# Patient Record
Sex: FEMALE | Race: WHITE | NOT HISPANIC OR LATINO | URBAN - METROPOLITAN AREA
[De-identification: names, ages, dates, MRNs, and addresses within clinical notes are randomized per-mention and may not be internally consistent; named-entity substitution may affect disease eponyms.]

---

## 2021-12-29 ENCOUNTER — INPATIENT (INPATIENT)
Facility: HOSPITAL | Age: 81
LOS: 8 days | Discharge: HOME | DRG: 190 | End: 2022-01-07
Attending: INTERNAL MEDICINE | Admitting: INTERNAL MEDICINE
Payer: MEDICARE

## 2021-12-29 VITALS
RESPIRATION RATE: 14 BRPM | DIASTOLIC BLOOD PRESSURE: 63 MMHG | OXYGEN SATURATION: 94 % | HEART RATE: 70 BPM | TEMPERATURE: 99 F | HEIGHT: 67 IN | SYSTOLIC BLOOD PRESSURE: 131 MMHG | WEIGHT: 214.95 LBS

## 2021-12-29 DIAGNOSIS — J45.901 UNSPECIFIED ASTHMA WITH (ACUTE) EXACERBATION: ICD-10-CM

## 2021-12-29 LAB
ALBUMIN SERPL ELPH-MCNC: 2.8 G/DL — LOW (ref 3.3–5)
ALP SERPL-CCNC: 121 U/L — HIGH (ref 40–120)
ALT FLD-CCNC: 41 U/L — SIGNIFICANT CHANGE UP (ref 12–78)
ANION GAP SERPL CALC-SCNC: 4 MMOL/L — LOW (ref 5–17)
APPEARANCE UR: CLEAR — SIGNIFICANT CHANGE UP
APTT BLD: 35.5 SEC — SIGNIFICANT CHANGE UP (ref 27.5–35.5)
AST SERPL-CCNC: 25 U/L — SIGNIFICANT CHANGE UP (ref 15–37)
BASOPHILS # BLD AUTO: 0.02 K/UL — SIGNIFICANT CHANGE UP (ref 0–0.2)
BASOPHILS NFR BLD AUTO: 0.3 % — SIGNIFICANT CHANGE UP (ref 0–2)
BILIRUB SERPL-MCNC: 0.6 MG/DL — SIGNIFICANT CHANGE UP (ref 0.2–1.2)
BILIRUB UR-MCNC: NEGATIVE — SIGNIFICANT CHANGE UP
BUN SERPL-MCNC: 38 MG/DL — HIGH (ref 7–23)
CALCIUM SERPL-MCNC: 10.1 MG/DL — SIGNIFICANT CHANGE UP (ref 8.5–10.1)
CHLORIDE SERPL-SCNC: 102 MMOL/L — SIGNIFICANT CHANGE UP (ref 96–108)
CO2 SERPL-SCNC: 30 MMOL/L — SIGNIFICANT CHANGE UP (ref 22–31)
COLOR SPEC: YELLOW — SIGNIFICANT CHANGE UP
CREAT SERPL-MCNC: 1 MG/DL — SIGNIFICANT CHANGE UP (ref 0.5–1.3)
D DIMER BLD IA.RAPID-MCNC: <150 NG/ML DDU — SIGNIFICANT CHANGE UP
DIFF PNL FLD: NEGATIVE — SIGNIFICANT CHANGE UP
EOSINOPHIL # BLD AUTO: 0.01 K/UL — SIGNIFICANT CHANGE UP (ref 0–0.5)
EOSINOPHIL NFR BLD AUTO: 0.1 % — SIGNIFICANT CHANGE UP (ref 0–6)
FLUAV AG NPH QL: SIGNIFICANT CHANGE UP
FLUBV AG NPH QL: SIGNIFICANT CHANGE UP
GLUCOSE SERPL-MCNC: 193 MG/DL — HIGH (ref 70–99)
GLUCOSE UR QL: 1000 MG/DL
HCT VFR BLD CALC: 52.5 % — HIGH (ref 34.5–45)
HGB BLD-MCNC: 17.1 G/DL — HIGH (ref 11.5–15.5)
IMM GRANULOCYTES NFR BLD AUTO: 0.5 % — SIGNIFICANT CHANGE UP (ref 0–1.5)
INR BLD: 1.75 RATIO — HIGH (ref 0.88–1.16)
KETONES UR-MCNC: NEGATIVE — SIGNIFICANT CHANGE UP
LEUKOCYTE ESTERASE UR-ACNC: NEGATIVE — SIGNIFICANT CHANGE UP
LYMPHOCYTES # BLD AUTO: 0.91 K/UL — LOW (ref 1–3.3)
LYMPHOCYTES # BLD AUTO: 12.4 % — LOW (ref 13–44)
MAGNESIUM SERPL-MCNC: 2.1 MG/DL — SIGNIFICANT CHANGE UP (ref 1.6–2.6)
MCHC RBC-ENTMCNC: 31.3 PG — SIGNIFICANT CHANGE UP (ref 27–34)
MCHC RBC-ENTMCNC: 32.6 GM/DL — SIGNIFICANT CHANGE UP (ref 32–36)
MCV RBC AUTO: 96 FL — SIGNIFICANT CHANGE UP (ref 80–100)
MONOCYTES # BLD AUTO: 0.59 K/UL — SIGNIFICANT CHANGE UP (ref 0–0.9)
MONOCYTES NFR BLD AUTO: 8 % — SIGNIFICANT CHANGE UP (ref 2–14)
NEUTROPHILS # BLD AUTO: 5.78 K/UL — SIGNIFICANT CHANGE UP (ref 1.8–7.4)
NEUTROPHILS NFR BLD AUTO: 78.7 % — HIGH (ref 43–77)
NITRITE UR-MCNC: NEGATIVE — SIGNIFICANT CHANGE UP
NT-PROBNP SERPL-SCNC: 793 PG/ML — HIGH (ref 0–450)
PH UR: 5 — SIGNIFICANT CHANGE UP (ref 5–8)
PLATELET # BLD AUTO: 147 K/UL — LOW (ref 150–400)
POTASSIUM SERPL-MCNC: 4 MMOL/L — SIGNIFICANT CHANGE UP (ref 3.5–5.3)
POTASSIUM SERPL-SCNC: 4 MMOL/L — SIGNIFICANT CHANGE UP (ref 3.5–5.3)
PROT SERPL-MCNC: 6.9 GM/DL — SIGNIFICANT CHANGE UP (ref 6–8.3)
PROT UR-MCNC: 15 MG/DL
PROTHROM AB SERPL-ACNC: 19.8 SEC — HIGH (ref 10.6–13.6)
RBC # BLD: 5.47 M/UL — HIGH (ref 3.8–5.2)
RBC # FLD: 15.6 % — HIGH (ref 10.3–14.5)
RSV RNA NPH QL NAA+NON-PROBE: SIGNIFICANT CHANGE UP
SARS-COV-2 RNA SPEC QL NAA+PROBE: SIGNIFICANT CHANGE UP
SODIUM SERPL-SCNC: 136 MMOL/L — SIGNIFICANT CHANGE UP (ref 135–145)
SP GR SPEC: 1.01 — SIGNIFICANT CHANGE UP (ref 1.01–1.02)
TROPONIN I, HIGH SENSITIVITY RESULT: 16.4 NG/L — SIGNIFICANT CHANGE UP
UROBILINOGEN FLD QL: NEGATIVE MG/DL — SIGNIFICANT CHANGE UP
WBC # BLD: 7.35 K/UL — SIGNIFICANT CHANGE UP (ref 3.8–10.5)
WBC # FLD AUTO: 7.35 K/UL — SIGNIFICANT CHANGE UP (ref 3.8–10.5)

## 2021-12-29 PROCEDURE — 94640 AIRWAY INHALATION TREATMENT: CPT

## 2021-12-29 PROCEDURE — 81001 URINALYSIS AUTO W/SCOPE: CPT

## 2021-12-29 PROCEDURE — 94010 BREATHING CAPACITY TEST: CPT

## 2021-12-29 PROCEDURE — 83036 HEMOGLOBIN GLYCOSYLATED A1C: CPT

## 2021-12-29 PROCEDURE — 85025 COMPLETE CBC W/AUTO DIFF WBC: CPT

## 2021-12-29 PROCEDURE — 71045 X-RAY EXAM CHEST 1 VIEW: CPT | Mod: 26

## 2021-12-29 PROCEDURE — 85027 COMPLETE CBC AUTOMATED: CPT

## 2021-12-29 PROCEDURE — 71045 X-RAY EXAM CHEST 1 VIEW: CPT

## 2021-12-29 PROCEDURE — 99223 1ST HOSP IP/OBS HIGH 75: CPT

## 2021-12-29 PROCEDURE — 99285 EMERGENCY DEPT VISIT HI MDM: CPT

## 2021-12-29 PROCEDURE — U0003: CPT

## 2021-12-29 PROCEDURE — 82962 GLUCOSE BLOOD TEST: CPT

## 2021-12-29 PROCEDURE — 36415 COLL VENOUS BLD VENIPUNCTURE: CPT

## 2021-12-29 PROCEDURE — 83735 ASSAY OF MAGNESIUM: CPT

## 2021-12-29 PROCEDURE — U0005: CPT

## 2021-12-29 PROCEDURE — 84100 ASSAY OF PHOSPHORUS: CPT

## 2021-12-29 PROCEDURE — 80048 BASIC METABOLIC PNL TOTAL CA: CPT

## 2021-12-29 PROCEDURE — 93010 ELECTROCARDIOGRAM REPORT: CPT

## 2021-12-29 RX ORDER — DEXTROSE 50 % IN WATER 50 %
25 SYRINGE (ML) INTRAVENOUS ONCE
Refills: 0 | Status: DISCONTINUED | OUTPATIENT
Start: 2021-12-29 | End: 2021-12-30

## 2021-12-29 RX ORDER — ATORVASTATIN CALCIUM 80 MG/1
1 TABLET, FILM COATED ORAL
Qty: 0 | Refills: 0 | DISCHARGE

## 2021-12-29 RX ORDER — INSULIN LISPRO 100/ML
VIAL (ML) SUBCUTANEOUS AT BEDTIME
Refills: 0 | Status: DISCONTINUED | OUTPATIENT
Start: 2021-12-29 | End: 2021-12-30

## 2021-12-29 RX ORDER — CEFTRIAXONE 500 MG/1
1000 INJECTION, POWDER, FOR SOLUTION INTRAMUSCULAR; INTRAVENOUS EVERY 24 HOURS
Refills: 0 | Status: DISCONTINUED | OUTPATIENT
Start: 2021-12-30 | End: 2021-12-30

## 2021-12-29 RX ORDER — RIVAROXABAN 15 MG-20MG
20 KIT ORAL
Refills: 0 | Status: DISCONTINUED | OUTPATIENT
Start: 2021-12-29 | End: 2022-01-07

## 2021-12-29 RX ORDER — FUROSEMIDE 40 MG
40 TABLET ORAL ONCE
Refills: 0 | Status: COMPLETED | OUTPATIENT
Start: 2021-12-29 | End: 2021-12-29

## 2021-12-29 RX ORDER — CEFTRIAXONE 500 MG/1
1000 INJECTION, POWDER, FOR SOLUTION INTRAMUSCULAR; INTRAVENOUS ONCE
Refills: 0 | Status: COMPLETED | OUTPATIENT
Start: 2021-12-29 | End: 2021-12-29

## 2021-12-29 RX ORDER — MONTELUKAST 4 MG/1
1 TABLET, CHEWABLE ORAL
Qty: 0 | Refills: 0 | DISCHARGE

## 2021-12-29 RX ORDER — RIVAROXABAN 15 MG-20MG
1 KIT ORAL
Qty: 0 | Refills: 0 | DISCHARGE

## 2021-12-29 RX ORDER — METOPROLOL TARTRATE 50 MG
1 TABLET ORAL
Qty: 0 | Refills: 0 | DISCHARGE

## 2021-12-29 RX ORDER — ATORVASTATIN CALCIUM 80 MG/1
80 TABLET, FILM COATED ORAL AT BEDTIME
Refills: 0 | Status: DISCONTINUED | OUTPATIENT
Start: 2021-12-29 | End: 2022-01-07

## 2021-12-29 RX ORDER — AZITHROMYCIN 500 MG/1
500 TABLET, FILM COATED ORAL EVERY 24 HOURS
Refills: 0 | Status: DISCONTINUED | OUTPATIENT
Start: 2021-12-30 | End: 2021-12-30

## 2021-12-29 RX ORDER — INSULIN GLARGINE 100 [IU]/ML
30 INJECTION, SOLUTION SUBCUTANEOUS AT BEDTIME
Refills: 0 | Status: DISCONTINUED | OUTPATIENT
Start: 2021-12-29 | End: 2021-12-30

## 2021-12-29 RX ORDER — SODIUM CHLORIDE 9 MG/ML
1000 INJECTION, SOLUTION INTRAVENOUS
Refills: 0 | Status: DISCONTINUED | OUTPATIENT
Start: 2021-12-29 | End: 2021-12-30

## 2021-12-29 RX ORDER — SPIRONOLACTONE 25 MG/1
1 TABLET, FILM COATED ORAL
Qty: 0 | Refills: 0 | DISCHARGE

## 2021-12-29 RX ORDER — DULOXETINE HYDROCHLORIDE 30 MG/1
1 CAPSULE, DELAYED RELEASE ORAL
Qty: 0 | Refills: 0 | DISCHARGE

## 2021-12-29 RX ORDER — EMPAGLIFLOZIN 10 MG/1
1 TABLET, FILM COATED ORAL
Qty: 0 | Refills: 0 | DISCHARGE

## 2021-12-29 RX ORDER — MONTELUKAST 4 MG/1
10 TABLET, CHEWABLE ORAL DAILY
Refills: 0 | Status: DISCONTINUED | OUTPATIENT
Start: 2021-12-29 | End: 2022-01-07

## 2021-12-29 RX ORDER — FLUTICASONE FUROATE AND VILANTEROL TRIFENATATE 100; 25 UG/1; UG/1
1 POWDER RESPIRATORY (INHALATION)
Qty: 0 | Refills: 0 | DISCHARGE

## 2021-12-29 RX ORDER — AZITHROMYCIN 500 MG/1
TABLET, FILM COATED ORAL
Refills: 0 | Status: DISCONTINUED | OUTPATIENT
Start: 2021-12-29 | End: 2021-12-30

## 2021-12-29 RX ORDER — CEFTRIAXONE 500 MG/1
INJECTION, POWDER, FOR SOLUTION INTRAMUSCULAR; INTRAVENOUS
Refills: 0 | Status: DISCONTINUED | OUTPATIENT
Start: 2021-12-29 | End: 2021-12-30

## 2021-12-29 RX ORDER — DULAGLUTIDE 4.5 MG/.5ML
0 INJECTION, SOLUTION SUBCUTANEOUS
Qty: 0 | Refills: 0 | DISCHARGE

## 2021-12-29 RX ORDER — BUDESONIDE AND FORMOTEROL FUMARATE DIHYDRATE 160; 4.5 UG/1; UG/1
2 AEROSOL RESPIRATORY (INHALATION)
Refills: 0 | Status: DISCONTINUED | OUTPATIENT
Start: 2021-12-29 | End: 2022-01-07

## 2021-12-29 RX ORDER — INSULIN LISPRO 100/ML
VIAL (ML) SUBCUTANEOUS
Refills: 0 | Status: DISCONTINUED | OUTPATIENT
Start: 2021-12-29 | End: 2021-12-30

## 2021-12-29 RX ORDER — IPRATROPIUM/ALBUTEROL SULFATE 18-103MCG
3 AEROSOL WITH ADAPTER (GRAM) INHALATION ONCE
Refills: 0 | Status: COMPLETED | OUTPATIENT
Start: 2021-12-29 | End: 2021-12-29

## 2021-12-29 RX ORDER — ASPIRIN/CALCIUM CARB/MAGNESIUM 324 MG
162 TABLET ORAL ONCE
Refills: 0 | Status: COMPLETED | OUTPATIENT
Start: 2021-12-29 | End: 2021-12-29

## 2021-12-29 RX ORDER — SPIRONOLACTONE 25 MG/1
25 TABLET, FILM COATED ORAL DAILY
Refills: 0 | Status: DISCONTINUED | OUTPATIENT
Start: 2021-12-29 | End: 2022-01-07

## 2021-12-29 RX ORDER — INSULIN DEGLUDEC 100 U/ML
30 INJECTION, SOLUTION SUBCUTANEOUS
Qty: 0 | Refills: 0 | DISCHARGE

## 2021-12-29 RX ORDER — DEXTROSE 50 % IN WATER 50 %
15 SYRINGE (ML) INTRAVENOUS ONCE
Refills: 0 | Status: DISCONTINUED | OUTPATIENT
Start: 2021-12-29 | End: 2021-12-30

## 2021-12-29 RX ORDER — AZITHROMYCIN 500 MG/1
500 TABLET, FILM COATED ORAL ONCE
Refills: 0 | Status: COMPLETED | OUTPATIENT
Start: 2021-12-29 | End: 2021-12-29

## 2021-12-29 RX ORDER — DEXTROSE 50 % IN WATER 50 %
12.5 SYRINGE (ML) INTRAVENOUS ONCE
Refills: 0 | Status: DISCONTINUED | OUTPATIENT
Start: 2021-12-29 | End: 2021-12-30

## 2021-12-29 RX ORDER — NITROGLYCERIN 6.5 MG
1 CAPSULE, EXTENDED RELEASE ORAL ONCE
Refills: 0 | Status: COMPLETED | OUTPATIENT
Start: 2021-12-29 | End: 2021-12-29

## 2021-12-29 RX ORDER — DULOXETINE HYDROCHLORIDE 30 MG/1
60 CAPSULE, DELAYED RELEASE ORAL DAILY
Refills: 0 | Status: DISCONTINUED | OUTPATIENT
Start: 2021-12-29 | End: 2022-01-07

## 2021-12-29 RX ORDER — GLUCAGON INJECTION, SOLUTION 0.5 MG/.1ML
1 INJECTION, SOLUTION SUBCUTANEOUS ONCE
Refills: 0 | Status: DISCONTINUED | OUTPATIENT
Start: 2021-12-29 | End: 2022-01-07

## 2021-12-29 RX ORDER — METOPROLOL TARTRATE 50 MG
50 TABLET ORAL
Refills: 0 | Status: DISCONTINUED | OUTPATIENT
Start: 2021-12-29 | End: 2022-01-07

## 2021-12-29 RX ORDER — ALBUTEROL 90 UG/1
2 AEROSOL, METERED ORAL
Refills: 0 | Status: DISCONTINUED | OUTPATIENT
Start: 2021-12-29 | End: 2022-01-07

## 2021-12-29 RX ORDER — ONDANSETRON 8 MG/1
4 TABLET, FILM COATED ORAL EVERY 6 HOURS
Refills: 0 | Status: DISCONTINUED | OUTPATIENT
Start: 2021-12-29 | End: 2022-01-07

## 2021-12-29 RX ORDER — ALBUTEROL 90 UG/1
2 AEROSOL, METERED ORAL
Qty: 0 | Refills: 0 | DISCHARGE

## 2021-12-29 RX ADMIN — Medication 4: at 22:20

## 2021-12-29 RX ADMIN — MONTELUKAST 10 MILLIGRAM(S): 4 TABLET, CHEWABLE ORAL at 12:50

## 2021-12-29 RX ADMIN — ATORVASTATIN CALCIUM 80 MILLIGRAM(S): 80 TABLET, FILM COATED ORAL at 22:20

## 2021-12-29 RX ADMIN — Medication 50 MILLIGRAM(S): at 15:44

## 2021-12-29 RX ADMIN — Medication 3 MILLILITER(S): at 01:44

## 2021-12-29 RX ADMIN — Medication 1 INCH(S): at 01:43

## 2021-12-29 RX ADMIN — SPIRONOLACTONE 25 MILLIGRAM(S): 25 TABLET, FILM COATED ORAL at 12:51

## 2021-12-29 RX ADMIN — INSULIN GLARGINE 30 UNIT(S): 100 INJECTION, SOLUTION SUBCUTANEOUS at 22:20

## 2021-12-29 RX ADMIN — AZITHROMYCIN 255 MILLIGRAM(S): 500 TABLET, FILM COATED ORAL at 13:36

## 2021-12-29 RX ADMIN — Medication 1 INCH(S): at 13:00

## 2021-12-29 RX ADMIN — Medication 40 MILLIGRAM(S): at 12:50

## 2021-12-29 RX ADMIN — BUDESONIDE AND FORMOTEROL FUMARATE DIHYDRATE 2 PUFF(S): 160; 4.5 AEROSOL RESPIRATORY (INHALATION) at 21:26

## 2021-12-29 RX ADMIN — Medication 4: at 16:41

## 2021-12-29 RX ADMIN — CEFTRIAXONE 100 MILLIGRAM(S): 500 INJECTION, POWDER, FOR SOLUTION INTRAMUSCULAR; INTRAVENOUS at 12:36

## 2021-12-29 RX ADMIN — Medication 6: at 12:51

## 2021-12-29 RX ADMIN — ALBUTEROL 2 PUFF(S): 90 AEROSOL, METERED ORAL at 21:26

## 2021-12-29 RX ADMIN — Medication 162 MILLIGRAM(S): at 01:43

## 2021-12-29 RX ADMIN — DULOXETINE HYDROCHLORIDE 60 MILLIGRAM(S): 30 CAPSULE, DELAYED RELEASE ORAL at 12:49

## 2021-12-29 RX ADMIN — Medication 80 MILLIGRAM(S): at 15:44

## 2021-12-29 RX ADMIN — Medication 125 MILLIGRAM(S): at 01:43

## 2021-12-29 RX ADMIN — RIVAROXABAN 20 MILLIGRAM(S): KIT at 15:44

## 2021-12-29 RX ADMIN — Medication 40 MILLIGRAM(S): at 01:43

## 2021-12-29 NOTE — ED ADULT NURSE NOTE - NSIMPLEMENTINTERV_GEN_ALL_ED
Implemented All Fall with Harm Risk Interventions:  [place] to call system. Call bell, personal items and telephone within reach. Instruct patient to call for assistance. Room bathroom lighting operational. Non-slip footwear when patient is off stretcher. Physically safe environment: no spills, clutter or unnecessary equipment. Stretcher in lowest position, wheels locked, appropriate side rails in place. Provide visual cue, wrist band, yellow gown, etc. Monitor gait and stability. Monitor for mental status changes and reorient to person, place, and time. Review medications for side effects contributing to fall risk. Reinforce activity limits and safety measures with patient and family. Provide visual clues: red socks.

## 2021-12-29 NOTE — ED ADULT TRIAGE NOTE - CHIEF COMPLAINT QUOTE
c/o difficulty breathing for past 3 weeks with associated cough, denies fever, use home O2, normal O2 levels at home is 92% on home O2, oxygen level at home was 80%, patient's O2 sat in triage 89% on RA, pulse 73, c/o increased chest congestion, patient has history of COPD, heart failure, pulmonary HTN, patient's daughter Tania Gil #[phone]

## 2021-12-29 NOTE — ED PROVIDER NOTE - PHYSICAL EXAMINATION
Gen:  ill appearing slightly dyspneic  Head:  NC/AT  HEENT: pupils perrl,no pharyngeal erythema, uvula midline  Cardiac: S1S2, RRR  Abd: Soft, non tender  Resp: lungs with decreased bs and exp wheeZing bilate with crackles  musculoskeletal:: no deformities, no swelling, strength +5/+5  Skin: warm and dry as visualized, no rashes  Neuro: MOONEY, cn2-12, aao x 4  Psych:alert, cooperative, appropriate mood and affect for situation

## 2021-12-29 NOTE — ED PROVIDER NOTE - OBJECTIVE STATEMENT
81F pw dyspnea/sob from home,with PMH CAD/stent/CABG, afib on AC, PPM, T2DM, asthma w/ Home O2 at night 2.5LNC, HFrEF, presents c/o worsening wheezing, cough and sob.  Pt endorses she was treated for asthma exac 3 weeks ago w/ doxycycline, prednisone taper and was improving until prednisone was down to 10mg and she seemed to have worsening symptoms again.  Pt is visiting up here from [place] and her medical team is down there. She denies any fever/chills, cp, palpitations, LE edema, n/v/d, abd pain, dysuria.  Vacc x3.

## 2021-12-29 NOTE — H&P ADULT - ASSESSMENT
81F w/PMH CAD/stent/CABG, afib on AC, PPM, T2DM, asthma w/ Home O2 at night 2.5LNC, HFrEF, presents c/o worsening wheezing, cough and sob.  Pt endorses she was treated for asthma exac 3 weeks ago w/ doxycycline, prednisone taper and was improving until prednisone was down to 10mg and she seemed to have worsening symptoms again.  Pt is visiting up here from [place] and her medical team is down there. She denies any fever/chills, cp, palpitations, LE edema, n/v/d, abd pain, dysuria.  Vacc x3.    In ED, Hg 17, ua neg, rvp/cov neg, given solumedrol 125mg, albuterol, lasix, asa, NTG, on 2LNC now.   CXR (by my read)- no apparent pneumonia       #Asthma exacerbation w/worsening cough  - albuterol inh Q6h  - solumedrol 80mg IV BID  - O2 - titrate to keep >92%  - iv rocephin/azithro - until final cxr read r/o pna  - pulm cs  - prn mucinex (pt declines at this time)    #HFrEF- reported EF 50%- chronic  - stable, appears euvolemic, NOT in exacerbation at this time  - c/w home dose torsemide, aldactone    #T2DM  - hold oral meds  - start on lantus 30 u (per pt's home dose of tresiba),   - SSI and montior FS- titrate as needed  - carb controlled diet  - check HA1c    #Afib- persistent, chronic  - c/w xarelto  - c/w bb    #PPX   - on xarelto

## 2021-12-29 NOTE — ED ADULT NURSE NOTE - CHIEF COMPLAINT QUOTE
c/o difficulty breathing for past 3 weeks with associated cough, denies fever, use home O2, normal O2 levels at home is 92% on home O2, oxygen level at home was 80%, patient's O2 sat in triage 89% on RA, pulse 73, c/o increased chest congestion, patient has history of COPD, heart failure, pulmonary HTN, patient's daughter Tania Gil #[phone]

## 2021-12-29 NOTE — H&P ADULT - HISTORY OF PRESENT ILLNESS
HPI:      PAST MEDICAL & SURGICAL HISTORY:      Review of Systems:   CONSTITUTIONAL: No fever.  EYES: No eye pain or discharge.  ENMT:  No sinus or throat pain  NECK: No pain or stiffness  RESPIRATORY: No cough, wheezing, chills or hemoptysis; No shortness of breath  CARDIOVASCULAR: No chest pain, palpitations, dizziness, or leg swelling  GASTROINTESTINAL: No abdominal or epigastric pain. No nausea, vomiting, or hematemesis; No diarrhea or constipation. No melena or hematochezia.  GENITOURINARY: No dysuria or incontinence  NEUROLOGICAL: No headaches, memory loss, loss of strength, numbness, or tremors  SKIN: No rashes.  LYMPH NODES: No enlarged glands  ENDOCRINE: No heat or cold intolerance; No hair loss  MUSCULOSKELETAL: No joint pain or swelling; No muscle, back, or extremity pain  PSYCHIATRIC: No depression, anxiety, mood swings, or difficulty sleeping  HEME/LYMPH: No easy bruising, or bleeding gums  ALLERY AND IMMUNOLOGIC: No hives or eczema    Allergies    penicillin (Swelling)    Intolerances        Social History:     FAMILY HISTORY:      Home Medications:  albuterol 90 mcg/inh inhalation aerosol: 2 puff(s) inhaled every 6 hours, As Needed (29 Dec 2021 09:54)  atorvastatin 80 mg oral tablet: 1 tab(s) orally once a day (29 Dec 2021 09:54)  Breo Ellipta 200 mcg-25 mcg/inh inhalation powder: 1 puff(s) inhaled once a day (29 Dec 2021 09:54)  doxycycline hyclate 100 mg oral capsule: 1 cap(s) orally 2 times a day (29 Dec 2021 09:54)  DULoxetine 60 mg oral delayed release capsule: 1 cap(s) orally once a day (29 Dec 2021 09:54)  Jardiance 25 mg oral tablet: 1 tab(s) orally once a day (in the morning) (29 Dec 2021 09:54)  metoprolol succinate 50 mg oral tablet, extended release: 1 tab(s) orally 2 times a day (29 Dec 2021 09:54)  montelukast 10 mg oral tablet: 1 tab(s) orally once a day (29 Dec 2021 09:54)  predniSONE 10 mg oral tablet: 1 tab(s) orally once a day    taper (29 Dec 2021 09:54)  spironolactone 25 mg oral tablet: 1 tab(s) orally once a day (29 Dec 2021 09:54)  torsemide 20 mg oral tablet: 2 tab(s) orally once a day (29 Dec 2021 09:54)  Tresiba 100 units/mL subcutaneous solution: 30 unit(s) subcutaneous once a day (at bedtime) (29 Dec 2021 09:54)  Trulicity Pen 1.5 mg/0.5 mL subcutaneous solution: subcutaneous every 7 days (29 Dec 2021 09:54)  Xarelto 20 mg oral tablet: 1 tab(s) orally once a day (in the evening) (29 Dec 2021 09:54)      MEDICATIONS  (STANDING):  ALBUTerol    90 MICROgram(s) HFA Inhaler 2 Puff(s) Inhalation four times a day  atorvastatin 80 milliGRAM(s) Oral at bedtime  azithromycin  IVPB      cefTRIAXone   IVPB      DULoxetine 60 milliGRAM(s) Oral daily  methylPREDNISolone sodium succinate Injectable 80 milliGRAM(s) IV Push two times a day  metoprolol succinate ER 50 milliGRAM(s) Oral two times a day  montelukast 10 milliGRAM(s) Oral daily  rivaroxaban 20 milliGRAM(s) Oral with dinner  spironolactone 25 milliGRAM(s) Oral daily  torsemide 40 milliGRAM(s) Oral daily    MEDICATIONS  (PRN):  ondansetron Injectable 4 milliGRAM(s) IV Push every 6 hours PRN Nausea      CAPILLARY BLOOD GLUCOSE        I&O's Summary      PHYSICAL EXAM:  Vital Signs Last 24 Hrs  T(C): 36.3 (29 Dec 2021 07:23), Max: 37.2 (29 Dec 2021 00:25)  T(F): 97.3 (29 Dec 2021 07:23), Max: 98.9 (29 Dec 2021 00:25)  HR: 70 (29 Dec 2021 07:23) (70 - 72)  BP: 107/55 (29 Dec 2021 07:23) (100/59 - 135/62)  BP(mean): 68 (29 Dec 2021 03:47) (68 - 68)  RR: 18 (29 Dec 2021 07:23) (14 - 18)  SpO2: 94% (29 Dec 2021 07:23) (94% - 95%)  GENERAL: NAD, well-developed  HEAD:  Atraumatic, Normocephalic  EYES: EOMI, PERRLA, conjunctiva and sclera clear  ENT: normal hearing, no nasal discharge, throat clear, dentition normal  NECK: Supple, No JVD, no LAD, no thyromegaly   CHEST/LUNG: Clear to auscultation bilaterally; No wheeze, respirations unlabored  HEART: Regular rate and rhythm; No murmurs, rubs, or gallops  ABDOMEN: Soft, Nontender, Nondistended; Bowel sounds present, no HSM  EXTREMITIES:  2+ Peripheral Pulses, No clubbing, cyanosis, or edema  PSYCH: AAOx3, normal behavior  NEUROLOGY: non-focal, sensory and cn 2-12 intact, speech/language intact  SKIN: No visible rashes or lesions    LABS:                        17.1   7.35  )-----------( 147      ( 29 Dec 2021 00:59 )             52.5         136  |  102  |  38<H>  ----------------------------<  193<H>  4.0   |  30  |  1.00    Ca    10.1      29 Dec 2021 00:59  Mg     2.1         TPro  6.9  /  Alb  2.8<L>  /  TBili  0.6  /  DBili  x   /  AST  25  /  ALT  41  /  AlkPhos  121<H>      PT/INR - ( 29 Dec 2021 00:59 )   PT: 19.8 sec;   INR: 1.75 ratio         PTT - ( 29 Dec 2021 00:59 )  PTT:35.5 sec      Urinalysis Basic - ( 29 Dec 2021 04:38 )    Color: Yellow / Appearance: Clear / S.010 / pH: x  Gluc: x / Ketone: Negative  / Bili: Negative / Urobili: Negative mg/dL   Blood: x / Protein: 15 mg/dL / Nitrite: Negative   Leuk Esterase: Negative / RBC: Negative /HPF / WBC Negative   Sq Epi: x / Non Sq Epi: Occasional / Bacteria: Negative        RADIOLOGY & ADDITIONAL TESTS:    Imaging Personally Reviewed:    EKG Personally Reviewed:    Consultant(s) Notes Reviewed:      Care Discussed with Consultants/Other Providers:   HPI:  81F w/PMH CAD/stent/CABG, afib on AC, PPM, T2DM, asthma w/ Home O2 at night 2.5LNC, HFrEF, presents c/o worsening wheezing, cough and sob.  Pt endorses she was treated for asthma exac 3 weeks ago w/ doxycycline, prednisone taper and was improving until prednisone was down to 10mg and she seemed to have worsening symptoms again.  Pt is visiting up here from [place] and her medical team is down there. She denies any fever/chills, cp, palpitations, LE edema, n/v/d, abd pain, dysuria.  Vacc x3.    In ED, Hg 17, ua neg, rvp/cov neg, given solumedrol 125mg, albuterol, lasix, asa, NTG, on 2LNC now.   CXR (by my read)- no apparent pneumonia     PAST MEDICAL & SURGICAL HISTORY:  asthma  CAD  Afib on AC  T2DM  HFrEF (50%)  depression  s/p heart stent  s/p CABG (2018)  s/p PPM  s/p cholecystectomy  s/p hysterectomy  Left ankle fracture repair    Review of Systems:   CONSTITUTIONAL: No fever.  EYES: No eye pain or discharge.  ENMT:  No sinus or throat pain  NECK: No pain or stiffness  RESPIRATORY: see hpi   CARDIOVASCULAR: No chest pain, palpitations, dizziness, or leg swelling  GASTROINTESTINAL: No abdominal or epigastric pain. No nausea, vomiting, or hematemesis; No diarrhea or constipation. No melena or hematochezia.  GENITOURINARY: No dysuria or incontinence  NEUROLOGICAL: No headaches, memory loss, loss of strength, numbness, or tremors  SKIN: No rashes.  MUSCULOSKELETAL: No joint pain or swelling; No muscle, back, or extremity pain  PSYCHIATRIC: No depression, anxiety, mood swings, or difficulty sleeping      Allergies  penicillin (Swelling)    Social History:   lives in [place]  denies smoking/etoh  ind ADLs    FAMILY HISTORY:  Mother  of ovarian CA  Father - heart disease    Home Medications:  albuterol 90 mcg/inh inhalation aerosol: 2 puff(s) inhaled every 6 hours, As Needed (29 Dec 2021 09:54)  atorvastatin 80 mg oral tablet: 1 tab(s) orally once a day (29 Dec 2021 09:54)  Breo Ellipta 200 mcg-25 mcg/inh inhalation powder: 1 puff(s) inhaled once a day (29 Dec 2021 09:54)  doxycycline hyclate 100 mg oral capsule: 1 cap(s) orally 2 times a day (29 Dec 2021 09:54)  DULoxetine 60 mg oral delayed release capsule: 1 cap(s) orally once a day (29 Dec 2021 09:54)  Jardiance 25 mg oral tablet: 1 tab(s) orally once a day (in the morning) (29 Dec 2021 09:54)  metoprolol succinate 50 mg oral tablet, extended release: 1 tab(s) orally 2 times a day (29 Dec 2021 09:54)  montelukast 10 mg oral tablet: 1 tab(s) orally once a day (29 Dec 2021 09:54)  predniSONE 10 mg oral tablet: 1 tab(s) orally once a day    taper (29 Dec 2021 09:54)  spironolactone 25 mg oral tablet: 1 tab(s) orally once a day (29 Dec 2021 09:54)  torsemide 20 mg oral tablet: 2 tab(s) orally once a day (29 Dec 2021 09:54)  Tresiba 100 units/mL subcutaneous solution: 30 unit(s) subcutaneous once a day (at bedtime) (29 Dec 2021 09:54)  Trulicity Pen 1.5 mg/0.5 mL subcutaneous solution: subcutaneous every 7 days (29 Dec 2021 09:54)  Xarelto 20 mg oral tablet: 1 tab(s) orally once a day (in the evening) (29 Dec 2021 09:54)      MEDICATIONS  (STANDING):  ALBUTerol    90 MICROgram(s) HFA Inhaler 2 Puff(s) Inhalation four times a day  atorvastatin 80 milliGRAM(s) Oral at bedtime  azithromycin  IVPB      cefTRIAXone   IVPB      DULoxetine 60 milliGRAM(s) Oral daily  methylPREDNISolone sodium succinate Injectable 80 milliGRAM(s) IV Push two times a day  metoprolol succinate ER 50 milliGRAM(s) Oral two times a day  montelukast 10 milliGRAM(s) Oral daily  rivaroxaban 20 milliGRAM(s) Oral with dinner  spironolactone 25 milliGRAM(s) Oral daily  torsemide 40 milliGRAM(s) Oral daily    MEDICATIONS  (PRN):  ondansetron Injectable 4 milliGRAM(s) IV Push every 6 hours PRN Nausea      PHYSICAL EXAM:  Vital Signs Last 24 Hrs  T(C): 36.3 (29 Dec 2021 07:23), Max: 37.2 (29 Dec 2021 00:25)  T(F): 97.3 (29 Dec 2021 07:23), Max: 98.9 (29 Dec 2021 00:25)  HR: 70 (29 Dec 2021 07:23) (70 - 72)  BP: 107/55 (29 Dec 2021 07:23) (100/59 - 135/62)  BP(mean): 68 (29 Dec 2021 03:47) (68 - 68)  RR: 18 (29 Dec 2021 07:23) (14 - 18)  SpO2: 94% (29 Dec 2021 07:) (94% - 95%)  GENERAL: NAD, cushinoid appearing   HEAD:  Atraumatic, Normocephalic  EYES: EOMI, PERRLA, conjunctiva and sclera clear  ENT: normal hearing, no nasal discharge, throat clear, dentition normal  NECK: Supple, No JVD, no LAD, no thyromegaly   CHEST/LUNG: ++RHONCHI L>R,  No wheeze, respirations unlabored  HEART: IRRegular irreg No murmurs, rubs, or gallops  ABDOMEN: Soft, Nontender, Nondistended; Bowel sounds present, no HSM  EXTREMITIES:  2+ Peripheral Pulses, No clubbing, cyanosis, or edema  PSYCH: AAOx3, normal behavior  NEUROLOGY: non-focal, sensory and cn 2-12 intact, speech/language intact  SKIN: No visible rashes or lesions    LABS:                        17.1   7.35  )-----------( 147      ( 29 Dec 2021 00:59 )             52.5         136  |  102  |  38<H>  ----------------------------<  193<H>  4.0   |  30  |  1.00    Ca    10.1      29 Dec 2021 00:59  Mg     2.1         TPro  6.9  /  Alb  2.8<L>  /  TBili  0.6  /  DBili  x   /  AST  25  /  ALT  41  /  AlkPhos  121<H>  12-29    PT/INR - ( 29 Dec 2021 00:59 )   PT: 19.8 sec;   INR: 1.75 ratio         PTT - ( 29 Dec 2021 00:59 )  PTT:35.5 sec      Urinalysis Basic - ( 29 Dec 2021 04:38 )    Color: Yellow / Appearance: Clear / S.010 / pH: x  Gluc: x / Ketone: Negative  / Bili: Negative / Urobili: Negative mg/dL   Blood: x / Protein: 15 mg/dL / Nitrite: Negative   Leuk Esterase: Negative / RBC: Negative /HPF / WBC Negative   Sq Epi: x / Non Sq Epi: Occasional / Bacteria: Negative        RADIOLOGY & ADDITIONAL TESTS:    Imaging Personally Reviewed:  CXR NEG   EKG Personally Reviewed:  N/A  Consultant(s) Notes Reviewed:      Care Discussed with Consultants/Other Providers:

## 2021-12-29 NOTE — ED ADULT NURSE NOTE - OBJECTIVE STATEMENT
Pt BIBA for SOB. Pt states recent diagnosis of bronchitis and has a history of asthma and CHF. Symptoms include chest tightness and cough. Denies fever, nausea or vomiting. Monitors in place. no acute distress noted at this time.

## 2021-12-29 NOTE — CONSULT NOTE ADULT - SUBJECTIVE AND OBJECTIVE BOX
HPI:  HPI:  81F w/PMH CAD/stent/CABG, afib on AC, PPM, T2DM, asthma w/ Home O2 at night 2.5LNC, HFrEF, presents c/o worsening wheezing, cough and sob.  Pt endorses she was treated for asthma exac 3 weeks ago w/ doxycycline, prednisone taper and was improving until prednisone was down to 10mg and she seemed to have worsening symptoms again.  Pt is visiting up here from [place] and her medical team is down there. She denies any fever/chills, cp, palpitations, LE edema, n/v/d, abd pain, dysuria.  Vacc x3.    Pt with h.o. chronic asthma, needs to go on streroids  about 2 times per year, 2 previous hospitalizations, last in 2019, no h.o. intubation.  Uses NC O2 at 2.5 L/min duringt sleep and about 3 hours (prn) during daytime.     : sitting, no distress, has coughing and wheezing.     In ED, Hg 17, ua neg, rvp/cov neg, given solumedrol 125mg, albuterol, lasix, asa, NTG, on 2LNC now.   CXR (by my read)- no apparent pneumonia     PAST MEDICAL & SURGICAL HISTORY:  asthma  CAD  Afib on AC  T2DM  HFrEF (50%)  depression  s/p heart stent  s/p CABG (2018)  s/p PPM  s/p cholecystectomy  s/p hysterectomy  Left ankle fracture repair    Review of Systems:   CONSTITUTIONAL: No fever.  EYES: No eye pain or discharge.  ENMT:  No sinus or throat pain  NECK: No pain or stiffness  RESPIRATORY: see hpi   CARDIOVASCULAR: No chest pain, palpitations, dizziness, or leg swelling  GASTROINTESTINAL: No abdominal or epigastric pain. No nausea, vomiting, or hematemesis; No diarrhea or constipation. No melena or hematochezia.  GENITOURINARY: No dysuria or incontinence  NEUROLOGICAL: No headaches, memory loss, loss of strength, numbness, or tremors  SKIN: No rashes.  MUSCULOSKELETAL: No joint pain or swelling; No muscle, back, or extremity pain  PSYCHIATRIC: No depression, anxiety, mood swings, or difficulty sleeping      Allergies  penicillin (Swelling)    Social History:   lives in [place]  denies smoking/etoh  ind ADLs    FAMILY HISTORY:  Mother  of ovarian CA  Father - heart disease    Home Medications:  albuterol 90 mcg/inh inhalation aerosol: 2 puff(s) inhaled every 6 hours, As Needed (29 Dec 2021 09:54)  atorvastatin 80 mg oral tablet: 1 tab(s) orally once a day (29 Dec 2021 09:54)  Breo Ellipta 200 mcg-25 mcg/inh inhalation powder: 1 puff(s) inhaled once a day (29 Dec 2021 09:54)  doxycycline hyclate 100 mg oral capsule: 1 cap(s) orally 2 times a day (29 Dec 2021 09:54)  DULoxetine 60 mg oral delayed release capsule: 1 cap(s) orally once a day (29 Dec 2021 09:54)  Jardiance 25 mg oral tablet: 1 tab(s) orally once a day (in the morning) (29 Dec 2021 09:54)  metoprolol succinate 50 mg oral tablet, extended release: 1 tab(s) orally 2 times a day (29 Dec 2021 09:54)  montelukast 10 mg oral tablet: 1 tab(s) orally once a day (29 Dec 2021 09:54)  predniSONE 10 mg oral tablet: 1 tab(s) orally once a day    taper (29 Dec 2021 09:54)  spironolactone 25 mg oral tablet: 1 tab(s) orally once a day (29 Dec 2021 09:54)  torsemide 20 mg oral tablet: 2 tab(s) orally once a day (29 Dec 2021 09:54)  Tresiba 100 units/mL subcutaneous solution: 30 unit(s) subcutaneous once a day (at bedtime) (29 Dec 2021 09:54)  Trulicity Pen 1.5 mg/0.5 mL subcutaneous solution: subcutaneous every 7 days (29 Dec 2021 09:54)  Xarelto 20 mg oral tablet: 1 tab(s) orally once a day (in the evening) (29 Dec 2021 09:54)      MEDICATIONS  (STANDING):  ALBUTerol    90 MICROgram(s) HFA Inhaler 2 Puff(s) Inhalation four times a day  atorvastatin 80 milliGRAM(s) Oral at bedtime  azithromycin  IVPB      cefTRIAXone   IVPB      DULoxetine 60 milliGRAM(s) Oral daily  methylPREDNISolone sodium succinate Injectable 80 milliGRAM(s) IV Push two times a day  metoprolol succinate ER 50 milliGRAM(s) Oral two times a day  montelukast 10 milliGRAM(s) Oral daily  rivaroxaban 20 milliGRAM(s) Oral with dinner  spironolactone 25 milliGRAM(s) Oral daily  torsemide 40 milliGRAM(s) Oral daily    MEDICATIONS  (PRN):  ondansetron Injectable 4 milliGRAM(s) IV Push every 6 hours PRN Nausea      Labs                      17.1   7.35  )-----------( 147      ( 29 Dec 2021 00:59 )             52.5         136  |  102  |  38<H>  ----------------------------<  193<H>  4.0   |  30  |  1.00    Ca    10.1      29 Dec 2021 00:59  Mg     2.1         TPro  6.9  /  Alb  2.8<L>  /  TBili  0.6  /  DBili  x   /  AST  25  /  ALT  41  /  AlkPhos  121<H>      PT/INR - ( 29 Dec 2021 00:59 )   PT: 19.8 sec;   INR: 1.75 ratio         PTT - ( 29 Dec 2021 00:59 )  PTT:35.5 sec      Urinalysis Basic - ( 29 Dec 2021 04:38 )    Color: Yellow / Appearance: Clear / S.010 / pH: x  Gluc: x / Ketone: Negative  / Bili: Negative / Urobili: Negative mg/dL   Blood: x / Protein: 15 mg/dL / Nitrite: Negative   Leuk Esterase: Negative / RBC: Negative /HPF / WBC Negative   Sq Epi: x / Non Sq Epi: Occasional / Bacteria: Negative        RADIOLOGY & ADDITIONAL TESTS:    Imaging Personally Reviewed:  CXR NEG   EKG Personally Reviewed:  N/A  Consultant(s) Notes Reviewed:      Care Discussed with Consultants/Other Providers:   (29 Dec 2021 10:40)      PAST MEDICAL & SURGICAL HISTORY:      MEDICATIONS  (STANDING):  ALBUTerol    90 MICROgram(s) HFA Inhaler 2 Puff(s) Inhalation four times a day  atorvastatin 80 milliGRAM(s) Oral at bedtime  azithromycin  IVPB      cefTRIAXone   IVPB      dextrose 40% Gel 15 Gram(s) Oral once  dextrose 5%. 1000 milliLiter(s) (50 mL/Hr) IV Continuous <Continuous>  dextrose 5%. 1000 milliLiter(s) (100 mL/Hr) IV Continuous <Continuous>  dextrose 50% Injectable 25 Gram(s) IV Push once  dextrose 50% Injectable 12.5 Gram(s) IV Push once  dextrose 50% Injectable 25 Gram(s) IV Push once  DULoxetine 60 milliGRAM(s) Oral daily  glucagon  Injectable 1 milliGRAM(s) IntraMuscular once  insulin glargine Injectable (LANTUS) 30 Unit(s) SubCutaneous at bedtime  insulin lispro (ADMELOG) corrective regimen sliding scale   SubCutaneous three times a day before meals  insulin lispro (ADMELOG) corrective regimen sliding scale   SubCutaneous at bedtime  methylPREDNISolone sodium succinate Injectable 80 milliGRAM(s) IV Push two times a day  metoprolol succinate ER 50 milliGRAM(s) Oral two times a day  montelukast 10 milliGRAM(s) Oral daily  rivaroxaban 20 milliGRAM(s) Oral with dinner  spironolactone 25 milliGRAM(s) Oral daily  torsemide 40 milliGRAM(s) Oral daily    MEDICATIONS  (PRN):  guaiFENesin ER 1200 milliGRAM(s) Oral every 12 hours PRN Cough  ondansetron Injectable 4 milliGRAM(s) IV Push every 6 hours PRN Nausea      Allergies    penicillin (Swelling)    Intolerances        SOCIAL HISTORY: Denies tobacco, etoh abuse or illicit drug use    FAMILY HISTORY:      Vital Signs Last 24 Hrs  T(C): 36.3 (29 Dec 2021 07:23), Max: 37.2 (29 Dec 2021 00:25)  T(F): 97.3 (29 Dec 2021 07:23), Max: 98.9 (29 Dec 2021 00:25)  HR: 70 (29 Dec 2021 07:23) (70 - 72)  BP: 107/55 (29 Dec 2021 07:23) (100/59 - 135/62)  BP(mean): 68 (29 Dec 2021 03:47) (68 - 68)  RR: 18 (29 Dec 2021 07:23) (14 - 18)  SpO2: 94% (29 Dec 2021 07:23) (94% - 95%)    REVIEW OF SYSTEMS:    CONSTITUTIONAL:  As per HPI.  HEENT:  Eyes:  No diplopia or blurred vision. ENT:  No earache, sore throat or runny nose.  CARDIOVASCULAR:  No pressure, squeezing, tightness, heaviness or aching about the chest, neck, axilla or epigastrium.  RESPIRATORY:  cough and wheeze, sob.   GASTROINTESTINAL:  No nausea, vomiting or diarrhea.  GENITOURINARY:  No dysuria, frequency or urgency.  MUSCULOSKELETAL:  As per HPI.  SKIN:  No change in skin, hair or nails.  NEUROLOGIC:  No paresthesias, fasciculations, seizures or weakness.  PSYCHIATRIC:  No disorder of thought or mood.  ENDOCRINE:  No heat or cold intolerance, polyuria or polydipsia.  HEMATOLOGICAL:  No easy bruising or bleedings:  .     PHYSICAL EXAMINATION:    GENERAL APPEARANCE:  Pt. is in no distress. Pt. is alert, oriented, and pleasant.  HEENT:  Pupils are normal and react normally. No icterus. Mucous membranes well colored.  NECK:  Supple. No lymphadenopathy. Jugular venous pressure not elevated. Carotids equal.   HEART:   The cardiac impulse has a normal quality. Regular. Normal S1 and S2. There are no murmurs, rubs or gallops noted  CHEST:  Bilateral rhonchi and bilat wheezing.   ABDOMEN:  Soft and nontender.   EXTREMITIES:  There is no cyanosis, clubbing.  Trace LE edema.   SKIN:  No rash or significant lesions are noted.  Neuro: Alert, awake, and O x 3.      LABS:                        17.1   7.35  )-----------( 147      ( 29 Dec 2021 00:59 )             52.5         136  |  102  |  38<H>  ----------------------------<  193<H>  4.0   |  30  |  1.00    Ca    10.1      29 Dec 2021 00:59  Mg     2.1         TPro  6.9  /  Alb  2.8<L>  /  TBili  0.6  /  DBili  x   /  AST  25  /  ALT  41  /  AlkPhos  121<H>      LIVER FUNCTIONS - ( 29 Dec 2021 00:59 )  Alb: 2.8 g/dL / Pro: 6.9 gm/dL / ALK PHOS: 121 U/L / ALT: 41 U/L / AST: 25 U/L / GGT: x           PT/INR - ( 29 Dec 2021 00:59 )   PT: 19.8 sec;   INR: 1.75 ratio         PTT - ( 29 Dec 2021 00:59 )  PTT:35.5 sec      Urinalysis Basic - ( 29 Dec 2021 04:38 )    Color: Yellow / Appearance: Clear / S.010 / pH: x  Gluc: x / Ketone: Negative  / Bili: Negative / Urobili: Negative mg/dL   Blood: x / Protein: 15 mg/dL / Nitrite: Negative   Leuk Esterase: Negative / RBC: Negative /HPF / WBC Negative   Sq Epi: x / Non Sq Epi: Occasional / Bacteria: Negative          RADIOLOGY & ADDITIONAL STUDIES:

## 2021-12-29 NOTE — CONSULT NOTE ADULT - ASSESSMENT
CXR reviewed. No definite infiltrate.      NC O2 as needed.  IV steroids- on solumedrol 80 mg q 12 hours, symbicort, albuterol, montelukast.  Antibiotics.     Follow FS glucoses closely while on steroids.    DVT prophylaxis. On xarelto.      CXR reviewed.  No definite infiltrate.  Recommend continue azithro.     NC O2 as needed.  IV steroids- on solumedrol 80 mg q 12 hours, symbicort, albuterol, montelukast.       Follow FS glucoses closely while on steroids.    DVT prophylaxis. On xarelto.      CXR reviewed.  No definite infiltrate, cardiomegaly.  Recommend continue azithro.     NC O2 as needed.  IV steroids- on solumedrol 80 mg q 12 hours, symbicort, albuterol, montelukast.       Follow FS glucoses closely while on steroids.    DVT prophylaxis. On xarelto.      CXR reviewed.  No definite infiltrate, cardiomegaly.  Recommend continue azithro.     NC O2 as needed.  IV steroids- on solumedrol 80 mg q 12 hours, symbicort, albuterol, montelukast.       Mild net diuresis.    Follow FS glucoses closely while on steroids.    DVT prophylaxis. On xarelto.

## 2021-12-30 DIAGNOSIS — I48.91 UNSPECIFIED ATRIAL FIBRILLATION: ICD-10-CM

## 2021-12-30 DIAGNOSIS — J98.01 ACUTE BRONCHOSPASM: ICD-10-CM

## 2021-12-30 DIAGNOSIS — R06.02 SHORTNESS OF BREATH: ICD-10-CM

## 2021-12-30 DIAGNOSIS — J45.901 UNSPECIFIED ASTHMA WITH (ACUTE) EXACERBATION: ICD-10-CM

## 2021-12-30 LAB
A1C WITH ESTIMATED AVERAGE GLUCOSE RESULT: 9 % — HIGH (ref 4–5.6)
ANION GAP SERPL CALC-SCNC: 3 MMOL/L — LOW (ref 5–17)
BUN SERPL-MCNC: 46 MG/DL — HIGH (ref 7–23)
CALCIUM SERPL-MCNC: 10.4 MG/DL — HIGH (ref 8.5–10.1)
CHLORIDE SERPL-SCNC: 102 MMOL/L — SIGNIFICANT CHANGE UP (ref 96–108)
CO2 SERPL-SCNC: 33 MMOL/L — HIGH (ref 22–31)
CREAT SERPL-MCNC: 0.96 MG/DL — SIGNIFICANT CHANGE UP (ref 0.5–1.3)
ESTIMATED AVERAGE GLUCOSE: 212 MG/DL — HIGH (ref 68–114)
GLUCOSE SERPL-MCNC: 201 MG/DL — HIGH (ref 70–99)
HCT VFR BLD CALC: 53 % — HIGH (ref 34.5–45)
HGB BLD-MCNC: 17.4 G/DL — HIGH (ref 11.5–15.5)
MCHC RBC-ENTMCNC: 31.5 PG — SIGNIFICANT CHANGE UP (ref 27–34)
MCHC RBC-ENTMCNC: 32.8 GM/DL — SIGNIFICANT CHANGE UP (ref 32–36)
MCV RBC AUTO: 95.8 FL — SIGNIFICANT CHANGE UP (ref 80–100)
PLATELET # BLD AUTO: 152 K/UL — SIGNIFICANT CHANGE UP (ref 150–400)
POTASSIUM SERPL-MCNC: 3.6 MMOL/L — SIGNIFICANT CHANGE UP (ref 3.5–5.3)
POTASSIUM SERPL-SCNC: 3.6 MMOL/L — SIGNIFICANT CHANGE UP (ref 3.5–5.3)
RBC # BLD: 5.53 M/UL — HIGH (ref 3.8–5.2)
RBC # FLD: 15.5 % — HIGH (ref 10.3–14.5)
SODIUM SERPL-SCNC: 138 MMOL/L — SIGNIFICANT CHANGE UP (ref 135–145)
WBC # BLD: 8.21 K/UL — SIGNIFICANT CHANGE UP (ref 3.8–10.5)
WBC # FLD AUTO: 8.21 K/UL — SIGNIFICANT CHANGE UP (ref 3.8–10.5)

## 2021-12-30 PROCEDURE — 99233 SBSQ HOSP IP/OBS HIGH 50: CPT

## 2021-12-30 PROCEDURE — 99232 SBSQ HOSP IP/OBS MODERATE 35: CPT

## 2021-12-30 RX ORDER — DEXTROSE 50 % IN WATER 50 %
25 SYRINGE (ML) INTRAVENOUS ONCE
Refills: 0 | Status: DISCONTINUED | OUTPATIENT
Start: 2021-12-30 | End: 2021-12-31

## 2021-12-30 RX ORDER — INSULIN GLARGINE 100 [IU]/ML
30 INJECTION, SOLUTION SUBCUTANEOUS AT BEDTIME
Refills: 0 | Status: DISCONTINUED | OUTPATIENT
Start: 2021-12-30 | End: 2021-12-31

## 2021-12-30 RX ORDER — SODIUM CHLORIDE 9 MG/ML
1000 INJECTION, SOLUTION INTRAVENOUS
Refills: 0 | Status: DISCONTINUED | OUTPATIENT
Start: 2021-12-30 | End: 2021-12-31

## 2021-12-30 RX ORDER — INSULIN LISPRO 100/ML
4 VIAL (ML) SUBCUTANEOUS
Refills: 0 | Status: DISCONTINUED | OUTPATIENT
Start: 2021-12-30 | End: 2021-12-31

## 2021-12-30 RX ORDER — INSULIN LISPRO 100/ML
VIAL (ML) SUBCUTANEOUS AT BEDTIME
Refills: 0 | Status: DISCONTINUED | OUTPATIENT
Start: 2021-12-30 | End: 2021-12-31

## 2021-12-30 RX ORDER — AZITHROMYCIN 500 MG/1
250 TABLET, FILM COATED ORAL DAILY
Refills: 0 | Status: DISCONTINUED | OUTPATIENT
Start: 2021-12-30 | End: 2021-12-31

## 2021-12-30 RX ORDER — GLUCAGON INJECTION, SOLUTION 0.5 MG/.1ML
1 INJECTION, SOLUTION SUBCUTANEOUS ONCE
Refills: 0 | Status: DISCONTINUED | OUTPATIENT
Start: 2021-12-30 | End: 2021-12-31

## 2021-12-30 RX ORDER — INSULIN LISPRO 100/ML
VIAL (ML) SUBCUTANEOUS
Refills: 0 | Status: DISCONTINUED | OUTPATIENT
Start: 2021-12-30 | End: 2021-12-31

## 2021-12-30 RX ORDER — DEXTROSE 50 % IN WATER 50 %
15 SYRINGE (ML) INTRAVENOUS ONCE
Refills: 0 | Status: DISCONTINUED | OUTPATIENT
Start: 2021-12-30 | End: 2021-12-31

## 2021-12-30 RX ORDER — DEXTROSE 50 % IN WATER 50 %
12.5 SYRINGE (ML) INTRAVENOUS ONCE
Refills: 0 | Status: DISCONTINUED | OUTPATIENT
Start: 2021-12-30 | End: 2021-12-31

## 2021-12-30 RX ADMIN — Medication 4: at 21:58

## 2021-12-30 RX ADMIN — Medication 80 MILLIGRAM(S): at 05:43

## 2021-12-30 RX ADMIN — MONTELUKAST 10 MILLIGRAM(S): 4 TABLET, CHEWABLE ORAL at 09:56

## 2021-12-30 RX ADMIN — BUDESONIDE AND FORMOTEROL FUMARATE DIHYDRATE 2 PUFF(S): 160; 4.5 AEROSOL RESPIRATORY (INHALATION) at 21:11

## 2021-12-30 RX ADMIN — Medication 40 MILLIGRAM(S): at 09:54

## 2021-12-30 RX ADMIN — INSULIN GLARGINE 30 UNIT(S): 100 INJECTION, SOLUTION SUBCUTANEOUS at 21:58

## 2021-12-30 RX ADMIN — ALBUTEROL 2 PUFF(S): 90 AEROSOL, METERED ORAL at 23:48

## 2021-12-30 RX ADMIN — Medication 6: at 13:12

## 2021-12-30 RX ADMIN — Medication 4 UNIT(S): at 16:38

## 2021-12-30 RX ADMIN — Medication 50 MILLIGRAM(S): at 16:55

## 2021-12-30 RX ADMIN — ALBUTEROL 2 PUFF(S): 90 AEROSOL, METERED ORAL at 15:25

## 2021-12-30 RX ADMIN — RIVAROXABAN 20 MILLIGRAM(S): KIT at 16:56

## 2021-12-30 RX ADMIN — Medication 8: at 16:56

## 2021-12-30 RX ADMIN — ATORVASTATIN CALCIUM 80 MILLIGRAM(S): 80 TABLET, FILM COATED ORAL at 21:59

## 2021-12-30 RX ADMIN — DULOXETINE HYDROCHLORIDE 60 MILLIGRAM(S): 30 CAPSULE, DELAYED RELEASE ORAL at 09:53

## 2021-12-30 RX ADMIN — Medication 50 MILLIGRAM(S): at 05:44

## 2021-12-30 RX ADMIN — BUDESONIDE AND FORMOTEROL FUMARATE DIHYDRATE 2 PUFF(S): 160; 4.5 AEROSOL RESPIRATORY (INHALATION) at 08:19

## 2021-12-30 RX ADMIN — ALBUTEROL 2 PUFF(S): 90 AEROSOL, METERED ORAL at 08:19

## 2021-12-30 RX ADMIN — Medication 80 MILLIGRAM(S): at 16:56

## 2021-12-30 RX ADMIN — Medication 4: at 08:40

## 2021-12-30 RX ADMIN — SPIRONOLACTONE 25 MILLIGRAM(S): 25 TABLET, FILM COATED ORAL at 09:54

## 2021-12-30 NOTE — PROGRESS NOTE ADULT - ASSESSMENT
cont symbicort/albuterol  cxr shows no pneumonia. Consider d/c abx  cont iv steroids  has mild polycythemia  dvt proph

## 2021-12-30 NOTE — PROGRESS NOTE ADULT - ASSESSMENT
80 yo woman with DM, CAD s/p PCI, CABG 2018, HfrEF, afib on AC, s/p PPM, asthma, chronic respiratory failure on home O2, last asthma exacerbation ~ 3 weeks prior to this presentation, was treated with course of doxycycline and prednisone, had prednisone tapered and once she was down to 10mg prednisone, she re-developed dyspnea, wheeze and cough so she came for evaluation. CXR negative. COVID, flu and RSV PCR negative. Started on IV steroids, continued on IC/LABA, admitted to Medicine.     Acute exacerbation of asthma  Suppose patient may have a component of more chronic obstructive lung disease. Has elevated HCO3, probably chronically hypercapneic in addition to chronically hypoxic for which she uses home O2 and some Hgb elevation. CXR neg. COVID, flu and RSV PCR negative. Appreciate input from Pulmonary Medicine. On IV steroids, IC, LABA, NABILA, and now improving gradually.   - Continue Solumedrol  - Continue IC/LABA/NABILA  - Will consider addition of LAMA but will discuss with Pulmonary first, would probably benefit from pulmonary function testing as outpatient, sleep evaluation as outpatient  - Carlos Alberto to complete 5 days  - Check peak flow    Chronic systolic CHF  Last LVEF 50%. Appears euvolemic  - Continue torsemide and spironolactone  - Is and Os  - Daily wt     Afib  Chronic, stable  - Continue beta blocker  - Continue Xarelto    Type II DM  A1c 9.0. Hyperglycemic in setting of steroids and underlying suboptimal control as evidenced by her elevated Hgb A1c. On Tresiba, Trulicity and Jardiance as outpatient. Here she has been placed on Lantus and Lispro.  - Check glucose TID AC and HS  - Continue Lantus and Lispro, anticipate needing to uptitrate prandial insulin coverage due to her steroid use       Diet: DM  DVT px: On Xarelto for afib  Code status: Full  Dispo: Anticipate DC to home when clinically improved

## 2021-12-30 NOTE — PROGRESS NOTE ADULT - SUBJECTIVE AND OBJECTIVE BOX
Chief Complaint: Dyspnea, cough    Interval Hx: Patient reports improvement since initial presentation though still with somewhat frequent dry cough, wheeze, dyspnea with minimal exertion. No chest pain or tightness. No dizziness, lightheadedness or palpitations. No LE edema or calf tenderness. No other complaints.     ROS: Multisystem review is comprehensively negative x 10 systems except as above    Physical Exam:  T(F): 97.3 (30 Dec 2021 08:05), Max: 98.5 (29 Dec 2021 20:16)  HR: 70 (30 Dec 2021 08:05) (70 - 76)  BP: 116/61 (30 Dec 2021 08:05) (116/61 - 120/65)  RR: 18 (30 Dec 2021 08:05) (18 - 18)  SpO2: 93% (30 Dec 2021 08:05) (91% - 93%)    No acute distress  NCAT PERRL EOMI  Neck Supple No JVD  Chest Normal resp effort at rest, diffuse wheeze B/L  CVS: S1 S2 normal RRR  Abd: +BS  soft NT ND   Ext: No calf tenderness  Skin: Warm, intact  Neuro: A+OX3, CN intact, no focal deficits  Mood: Calm, pleasant    Labs:    Micro:    Imaging:    Cardiac Testing:    Meds: Chief Complaint: Dyspnea, cough    Interval Hx: Patient reports improvement since initial presentation though still with somewhat frequent dry cough, wheeze, dyspnea with minimal exertion. No chest pain or tightness. No dizziness, lightheadedness or palpitations. No LE edema or calf tenderness. No other complaints.     ROS: Multisystem review is comprehensively negative x 10 systems except as above    Physical Exam:  T(F): 97.3 (30 Dec 2021 08:05), Max: 98.5 (29 Dec 2021 20:16)  HR: 70 (30 Dec 2021 08:05) (70 - 76)  BP: 116/61 (30 Dec 2021 08:05) (116/61 - 120/65)  RR: 18 (30 Dec 2021 08:05) (18 - 18)  SpO2: 93% (30 Dec 2021 08:05) (91% - 93%)    No acute distress  NCAT PERRL EOMI  Neck Supple No JVD  Chest Normal resp effort at rest, diffuse wheeze B/L  CVS: S1 S2 normal RRR  Abd: +BS  soft NT ND   Ext: No calf tenderness  Skin: Warm, intact  Neuro: A+OX3, CN intact, no focal deficits  Mood: Calm, pleasant    Labs:    CBC 12/30                        17.4   8.21  )----------( 152             53.0     BMP 12/30    138  |  102  |  46  ----------------------<  201  3.6   |   33   |  0.96    Ca 10.4  Mg 2.1    TPro  6.9  /  Alb  2.8  /  TBili  0.6  /  DBili  x   /  AST  25  /  ALT  41  /  AlkPhos  121    PT: 19.8 sec;   INR: 1.75 ratio   PTT: 35.5 sec    Micro:  COVID19 PCR 12/29: Neg  Flu PCR 12/29: Neg  RSV PCR 12/29: Neg    Imaging:  CXR 12/29: Frontal expiratory view of the chest shows the heart to be borderline in size. Sternal wires and left cardiac pacemaker are present. The lungs are clear and there is no evidence of pneumothorax nor pleural effusion.    Cardiac Testing:  Tele reviewed    Meds:  MEDICATIONS  (STANDING):  ALBUTerol    90 MICROgram(s) HFA Inhaler 2 Puff(s) Inhalation four times a day  atorvastatin 80 milliGRAM(s) Oral at bedtime  azithromycin   Tablet 250 milliGRAM(s) Oral daily  budesonide 160 MICROgram(s)/formoterol 4.5 MICROgram(s) Inhaler 2 Puff(s) Inhalation two times a day  DULoxetine 60 milliGRAM(s) Oral daily  insulin glargine Injectable (LANTUS) 30 Unit(s) SubCutaneous at bedtime  insulin lispro (ADMELOG) corrective regimen sliding scale   SubCutaneous three times a day before meals  insulin lispro (ADMELOG) corrective regimen sliding scale   SubCutaneous at bedtime  insulin lispro Injectable (ADMELOG) 4 Unit(s) SubCutaneous three times a day before meals  methylPREDNISolone sodium succinate Injectable 80 milliGRAM(s) IV Push two times a day  metoprolol succinate ER 50 milliGRAM(s) Oral two times a day  montelukast 10 milliGRAM(s) Oral daily  rivaroxaban 20 milliGRAM(s) Oral with dinner  spironolactone 25 milliGRAM(s) Oral daily  torsemide 40 milliGRAM(s) Oral daily    MEDICATIONS  (PRN):  guaiFENesin ER 1200 milliGRAM(s) Oral every 12 hours PRN Cough  ondansetron Injectable 4 milliGRAM(s) IV Push every 6 hours PRN Nausea

## 2021-12-30 NOTE — PROGRESS NOTE ADULT - SUBJECTIVE AND OBJECTIVE BOX
Subjective:  less sob today      MEDICATIONS  (STANDING):  ALBUTerol    90 MICROgram(s) HFA Inhaler 2 Puff(s) Inhalation four times a day  atorvastatin 80 milliGRAM(s) Oral at bedtime  azithromycin  IVPB      azithromycin  IVPB 500 milliGRAM(s) IV Intermittent every 24 hours  budesonide 160 MICROgram(s)/formoterol 4.5 MICROgram(s) Inhaler 2 Puff(s) Inhalation two times a day  cefTRIAXone   IVPB 1000 milliGRAM(s) IV Intermittent every 24 hours  cefTRIAXone   IVPB      dextrose 40% Gel 15 Gram(s) Oral once  dextrose 5%. 1000 milliLiter(s) (50 mL/Hr) IV Continuous <Continuous>  dextrose 5%. 1000 milliLiter(s) (100 mL/Hr) IV Continuous <Continuous>  dextrose 50% Injectable 25 Gram(s) IV Push once  dextrose 50% Injectable 12.5 Gram(s) IV Push once  dextrose 50% Injectable 25 Gram(s) IV Push once  DULoxetine 60 milliGRAM(s) Oral daily  glucagon  Injectable 1 milliGRAM(s) IntraMuscular once  insulin glargine Injectable (LANTUS) 30 Unit(s) SubCutaneous at bedtime  insulin lispro (ADMELOG) corrective regimen sliding scale   SubCutaneous three times a day before meals  insulin lispro (ADMELOG) corrective regimen sliding scale   SubCutaneous at bedtime  methylPREDNISolone sodium succinate Injectable 80 milliGRAM(s) IV Push two times a day  metoprolol succinate ER 50 milliGRAM(s) Oral two times a day  montelukast 10 milliGRAM(s) Oral daily  rivaroxaban 20 milliGRAM(s) Oral with dinner  spironolactone 25 milliGRAM(s) Oral daily  torsemide 40 milliGRAM(s) Oral daily    MEDICATIONS  (PRN):  guaiFENesin ER 1200 milliGRAM(s) Oral every 12 hours PRN Cough  ondansetron Injectable 4 milliGRAM(s) IV Push every 6 hours PRN Nausea      Allergies    penicillin (Swelling)    Intolerances        REVIEW OF SYSTEMS:    CONSTITUTIONAL:  As per HPI.  HEENT:  Eyes:  No diplopia or blurred vision. ENT:  No earache, sore throat or runny nose.  CARDIOVASCULAR:  No pressure, squeezing, tightness, heaviness or aching about the chest, neck, axilla or epigastrium.  RESPIRATORY:  sob  GASTROINTESTINAL:  No nausea, vomiting or diarrhea.  GENITOURINARY:  No dysuria, frequency or urgency.  MUSCULOSKELETAL:  no joint pain, deformity, tenderness  EXTREMITIES: no clubbing cyanosis,edema  SKIN:  No change in skin, hair or nails.  NEUROLOGIC:  No paresthesias, fasciculations, seizures or weakness.  PSYCHIATRIC:  No disorder of thought or mood.  ENDOCRINE:  No heat or cold intolerance, polyuria or polydipsia.  HEMATOLOGICAL:  No easy bruising or bleedings:    Vital Signs Last 24 Hrs  T(C): 36.9 (29 Dec 2021 20:16), Max: 36.9 (29 Dec 2021 20:16)  T(F): 98.5 (29 Dec 2021 20:16), Max: 98.5 (29 Dec 2021 20:16)  HR: 76 (30 Dec 2021 05:40) (70 - 76)  BP: 120/65 (30 Dec 2021 05:40) (118/46 - 120/65)  BP(mean): --  RR: 18 (29 Dec 2021 20:16) (18 - 18)  SpO2: 91% (29 Dec 2021 20:16) (91% - 91%)    PHYSICAL EXAMINATION:  SKIN: no rashes  HEAD: NC/AT  EYES: PERRLA, EOMI  EARS: TM's intact  NOSE: no abnormalities  NECK:  Supple. No lymphadenopathy. Jugular venous pressure not elevated. Carotids equal.   HEART:   The cardiac impulse has a normal quality. Reg., Nl S1 and S2.  There are no murmurs, rubs or gallops noted  CHEST: bilat exp ronchi  ABDOMEN:  Soft and nontender.   EXTREMITIES:  no C/C/E  NEURO: AAO x 3, no focal deficts       LABS:                        17.4   8.21  )-----------( 152      ( 30 Dec 2021 06:58 )             53.0         136  |  102  |  38<H>  ----------------------------<  193<H>  4.0   |  30  |  1.00    Ca    10.1      29 Dec 2021 00:59  Mg     2.1         TPro  6.9  /  Alb  2.8<L>  /  TBili  0.6  /  DBili  x   /  AST  25  /  ALT  41  /  AlkPhos  121<H>  12    PT/INR - ( 29 Dec 2021 00:59 )   PT: 19.8 sec;   INR: 1.75 ratio         PTT - ( 29 Dec 2021 00:59 )  PTT:35.5 sec  Urinalysis Basic - ( 29 Dec 2021 04:38 )    Color: Yellow / Appearance: Clear / S.010 / pH: x  Gluc: x / Ketone: Negative  / Bili: Negative / Urobili: Negative mg/dL   Blood: x / Protein: 15 mg/dL / Nitrite: Negative   Leuk Esterase: Negative / RBC: Negative /HPF / WBC Negative   Sq Epi: x / Non Sq Epi: Occasional / Bacteria: Negative        RADIOLOGY & ADDITIONAL TESTS:

## 2021-12-31 LAB
BASOPHILS # BLD AUTO: 0.01 K/UL — SIGNIFICANT CHANGE UP (ref 0–0.2)
BASOPHILS NFR BLD AUTO: 0.1 % — SIGNIFICANT CHANGE UP (ref 0–2)
EOSINOPHIL # BLD AUTO: 0 K/UL — SIGNIFICANT CHANGE UP (ref 0–0.5)
EOSINOPHIL NFR BLD AUTO: 0 % — SIGNIFICANT CHANGE UP (ref 0–6)
HCT VFR BLD CALC: 53.4 % — HIGH (ref 34.5–45)
HGB BLD-MCNC: 17.5 G/DL — HIGH (ref 11.5–15.5)
IMM GRANULOCYTES NFR BLD AUTO: 0.4 % — SIGNIFICANT CHANGE UP (ref 0–1.5)
LYMPHOCYTES # BLD AUTO: 0.65 K/UL — LOW (ref 1–3.3)
LYMPHOCYTES # BLD AUTO: 7.3 % — LOW (ref 13–44)
MCHC RBC-ENTMCNC: 31.6 PG — SIGNIFICANT CHANGE UP (ref 27–34)
MCHC RBC-ENTMCNC: 32.8 GM/DL — SIGNIFICANT CHANGE UP (ref 32–36)
MCV RBC AUTO: 96.4 FL — SIGNIFICANT CHANGE UP (ref 80–100)
MONOCYTES # BLD AUTO: 0.34 K/UL — SIGNIFICANT CHANGE UP (ref 0–0.9)
MONOCYTES NFR BLD AUTO: 3.8 % — SIGNIFICANT CHANGE UP (ref 2–14)
NEUTROPHILS # BLD AUTO: 7.9 K/UL — HIGH (ref 1.8–7.4)
NEUTROPHILS NFR BLD AUTO: 88.4 % — HIGH (ref 43–77)
PLATELET # BLD AUTO: 162 K/UL — SIGNIFICANT CHANGE UP (ref 150–400)
RBC # BLD: 5.54 M/UL — HIGH (ref 3.8–5.2)
RBC # FLD: 15.3 % — HIGH (ref 10.3–14.5)
WBC # BLD: 8.94 K/UL — SIGNIFICANT CHANGE UP (ref 3.8–10.5)
WBC # FLD AUTO: 8.94 K/UL — SIGNIFICANT CHANGE UP (ref 3.8–10.5)

## 2021-12-31 PROCEDURE — 99233 SBSQ HOSP IP/OBS HIGH 50: CPT

## 2021-12-31 PROCEDURE — 99232 SBSQ HOSP IP/OBS MODERATE 35: CPT

## 2021-12-31 RX ORDER — FUROSEMIDE 40 MG
40 TABLET ORAL ONCE
Refills: 0 | Status: COMPLETED | OUTPATIENT
Start: 2021-12-31 | End: 2021-12-31

## 2021-12-31 RX ORDER — DEXTROSE 50 % IN WATER 50 %
25 SYRINGE (ML) INTRAVENOUS ONCE
Refills: 0 | Status: DISCONTINUED | OUTPATIENT
Start: 2021-12-31 | End: 2022-01-07

## 2021-12-31 RX ORDER — SODIUM CHLORIDE 9 MG/ML
1000 INJECTION, SOLUTION INTRAVENOUS
Refills: 0 | Status: DISCONTINUED | OUTPATIENT
Start: 2021-12-31 | End: 2022-01-07

## 2021-12-31 RX ORDER — INSULIN LISPRO 100/ML
6 VIAL (ML) SUBCUTANEOUS
Refills: 0 | Status: DISCONTINUED | OUTPATIENT
Start: 2021-12-31 | End: 2022-01-04

## 2021-12-31 RX ORDER — INSULIN LISPRO 100/ML
VIAL (ML) SUBCUTANEOUS
Refills: 0 | Status: DISCONTINUED | OUTPATIENT
Start: 2021-12-31 | End: 2022-01-07

## 2021-12-31 RX ORDER — DEXTROSE 50 % IN WATER 50 %
15 SYRINGE (ML) INTRAVENOUS ONCE
Refills: 0 | Status: DISCONTINUED | OUTPATIENT
Start: 2021-12-31 | End: 2022-01-07

## 2021-12-31 RX ORDER — GLUCAGON INJECTION, SOLUTION 0.5 MG/.1ML
1 INJECTION, SOLUTION SUBCUTANEOUS ONCE
Refills: 0 | Status: DISCONTINUED | OUTPATIENT
Start: 2021-12-31 | End: 2022-01-07

## 2021-12-31 RX ORDER — INSULIN LISPRO 100/ML
VIAL (ML) SUBCUTANEOUS AT BEDTIME
Refills: 0 | Status: DISCONTINUED | OUTPATIENT
Start: 2021-12-31 | End: 2022-01-07

## 2021-12-31 RX ORDER — DEXTROSE 50 % IN WATER 50 %
12.5 SYRINGE (ML) INTRAVENOUS ONCE
Refills: 0 | Status: DISCONTINUED | OUTPATIENT
Start: 2021-12-31 | End: 2022-01-07

## 2021-12-31 RX ORDER — INSULIN GLARGINE 100 [IU]/ML
30 INJECTION, SOLUTION SUBCUTANEOUS AT BEDTIME
Refills: 0 | Status: DISCONTINUED | OUTPATIENT
Start: 2021-12-31 | End: 2022-01-01

## 2021-12-31 RX ORDER — FUROSEMIDE 40 MG
60 TABLET ORAL DAILY
Refills: 0 | Status: DISCONTINUED | OUTPATIENT
Start: 2021-12-31 | End: 2021-12-31

## 2021-12-31 RX ADMIN — Medication 4: at 08:34

## 2021-12-31 RX ADMIN — Medication 4 UNIT(S): at 08:36

## 2021-12-31 RX ADMIN — Medication 80 MILLIGRAM(S): at 05:55

## 2021-12-31 RX ADMIN — Medication 80 MILLIGRAM(S): at 17:31

## 2021-12-31 RX ADMIN — Medication 4: at 21:51

## 2021-12-31 RX ADMIN — Medication 1200 MILLIGRAM(S): at 21:50

## 2021-12-31 RX ADMIN — Medication 40 MILLIGRAM(S): at 16:54

## 2021-12-31 RX ADMIN — Medication 8: at 11:39

## 2021-12-31 RX ADMIN — ALBUTEROL 2 PUFF(S): 90 AEROSOL, METERED ORAL at 20:55

## 2021-12-31 RX ADMIN — Medication 40 MILLIGRAM(S): at 09:31

## 2021-12-31 RX ADMIN — Medication 6 UNIT(S): at 11:39

## 2021-12-31 RX ADMIN — SPIRONOLACTONE 25 MILLIGRAM(S): 25 TABLET, FILM COATED ORAL at 09:31

## 2021-12-31 RX ADMIN — Medication 6 UNIT(S): at 17:30

## 2021-12-31 RX ADMIN — Medication 50 MILLIGRAM(S): at 05:55

## 2021-12-31 RX ADMIN — RIVAROXABAN 20 MILLIGRAM(S): KIT at 16:54

## 2021-12-31 RX ADMIN — DULOXETINE HYDROCHLORIDE 60 MILLIGRAM(S): 30 CAPSULE, DELAYED RELEASE ORAL at 11:40

## 2021-12-31 RX ADMIN — AZITHROMYCIN 250 MILLIGRAM(S): 500 TABLET, FILM COATED ORAL at 09:31

## 2021-12-31 RX ADMIN — ATORVASTATIN CALCIUM 80 MILLIGRAM(S): 80 TABLET, FILM COATED ORAL at 21:50

## 2021-12-31 RX ADMIN — BUDESONIDE AND FORMOTEROL FUMARATE DIHYDRATE 2 PUFF(S): 160; 4.5 AEROSOL RESPIRATORY (INHALATION) at 09:25

## 2021-12-31 RX ADMIN — Medication 50 MILLIGRAM(S): at 17:32

## 2021-12-31 RX ADMIN — ALBUTEROL 2 PUFF(S): 90 AEROSOL, METERED ORAL at 09:25

## 2021-12-31 RX ADMIN — Medication 6: at 17:30

## 2021-12-31 RX ADMIN — BUDESONIDE AND FORMOTEROL FUMARATE DIHYDRATE 2 PUFF(S): 160; 4.5 AEROSOL RESPIRATORY (INHALATION) at 20:54

## 2021-12-31 RX ADMIN — Medication 1200 MILLIGRAM(S): at 06:06

## 2021-12-31 RX ADMIN — MONTELUKAST 10 MILLIGRAM(S): 4 TABLET, CHEWABLE ORAL at 11:40

## 2021-12-31 RX ADMIN — INSULIN GLARGINE 30 UNIT(S): 100 INJECTION, SOLUTION SUBCUTANEOUS at 21:51

## 2021-12-31 NOTE — PROGRESS NOTE ADULT - SUBJECTIVE AND OBJECTIVE BOX
Subjective:  feeling better  less sob  some cough    MEDICATIONS  (STANDING):  ALBUTerol    90 MICROgram(s) HFA Inhaler 2 Puff(s) Inhalation four times a day  atorvastatin 80 milliGRAM(s) Oral at bedtime  azithromycin   Tablet 250 milliGRAM(s) Oral daily  budesonide 160 MICROgram(s)/formoterol 4.5 MICROgram(s) Inhaler 2 Puff(s) Inhalation two times a day  dextrose 40% Gel 15 Gram(s) Oral once  dextrose 5%. 1000 milliLiter(s) (50 mL/Hr) IV Continuous <Continuous>  dextrose 5%. 1000 milliLiter(s) (100 mL/Hr) IV Continuous <Continuous>  dextrose 50% Injectable 25 Gram(s) IV Push once  dextrose 50% Injectable 12.5 Gram(s) IV Push once  dextrose 50% Injectable 25 Gram(s) IV Push once  DULoxetine 60 milliGRAM(s) Oral daily  glucagon  Injectable 1 milliGRAM(s) IntraMuscular once  glucagon  Injectable 1 milliGRAM(s) IntraMuscular once  insulin glargine Injectable (LANTUS) 30 Unit(s) SubCutaneous at bedtime  insulin lispro (ADMELOG) corrective regimen sliding scale   SubCutaneous three times a day before meals  insulin lispro (ADMELOG) corrective regimen sliding scale   SubCutaneous at bedtime  insulin lispro Injectable (ADMELOG) 6 Unit(s) SubCutaneous three times a day before meals  methylPREDNISolone sodium succinate Injectable 80 milliGRAM(s) IV Push two times a day  metoprolol succinate ER 50 milliGRAM(s) Oral two times a day  montelukast 10 milliGRAM(s) Oral daily  rivaroxaban 20 milliGRAM(s) Oral with dinner  spironolactone 25 milliGRAM(s) Oral daily  torsemide 40 milliGRAM(s) Oral daily    MEDICATIONS  (PRN):  guaiFENesin ER 1200 milliGRAM(s) Oral every 12 hours PRN Cough  ondansetron Injectable 4 milliGRAM(s) IV Push every 6 hours PRN Nausea      Allergies    penicillin (Swelling)    Intolerances        REVIEW OF SYSTEMS:    CONSTITUTIONAL:  As per HPI.  HEENT:  Eyes:  No diplopia or blurred vision. ENT:  No earache, sore throat or runny nose.  CARDIOVASCULAR:  No pressure, squeezing, tightness, heaviness or aching about the chest, neck, axilla or epigastrium.  RESPIRATORY:  cough  GASTROINTESTINAL:  No nausea, vomiting or diarrhea.  GENITOURINARY:  No dysuria, frequency or urgency.  MUSCULOSKELETAL:  no joint pain, deformity, tenderness  EXTREMITIES: no clubbing cyanosis,edema  SKIN:  No change in skin, hair or nails.  NEUROLOGIC:  No paresthesias, fasciculations, seizures or weakness.  PSYCHIATRIC:  No disorder of thought or mood.  ENDOCRINE:  No heat or cold intolerance, polyuria or polydipsia.  HEMATOLOGICAL:  No easy bruising or bleedings:    Vital Signs Last 24 Hrs  T(C): 36.3 (31 Dec 2021 08:00), Max: 37 (30 Dec 2021 20:22)  T(F): 97.4 (31 Dec 2021 08:00), Max: 98.6 (30 Dec 2021 20:22)  HR: 78 (31 Dec 2021 09:30) (73 - 78)  BP: 120/64 (31 Dec 2021 09:30) (116/64 - 132/70)  BP(mean): --  RR: 18 (31 Dec 2021 09:30) (17 - 18)  SpO2: 93% (31 Dec 2021 09:30) (92% - 93%)    PHYSICAL EXAMINATION:  SKIN: no rashes  HEAD: NC/AT  EYES: PERRLA, EOMI  EARS: TM's intact  NOSE: no abnormalities  NECK:  Supple. No lymphadenopathy. Jugular venous pressure not elevated. Carotids equal.   HEART:   The cardiac impulse has a normal quality. Reg., Nl S1 and S2.  There are no murmurs, rubs or gallops noted  CHEST: scattered exp ronchi  ABDOMEN:  Soft and nontender.   EXTREMITIES:  no C/C/E  NEURO: AAO x 3, no focal deficts       LABS:                        17.5   8.94  )-----------( 162      ( 31 Dec 2021 08:07 )             53.4     12-30    138  |  102  |  46<H>  ----------------------------<  201<H>  3.6   |  33<H>  |  0.96    Ca    10.4<H>      30 Dec 2021 06:58            RADIOLOGY & ADDITIONAL TESTS:

## 2021-12-31 NOTE — PROGRESS NOTE ADULT - ASSESSMENT
improving  cont symbicort/albuterol  cxr shows no pneumonia. Consider d/c abx  cont iv steroids at current dose  has mild polycythemia  dvt proph

## 2021-12-31 NOTE — PROGRESS NOTE ADULT - SUBJECTIVE AND OBJECTIVE BOX
Chief Complaint: Dyspnea, cough    Interval Hx: Patient reports improvement since initial presentation though still with somewhat frequent dry cough, wheeze, dyspnea with minimal exertion. No chest pain or tightness. No dizziness, lightheadedness or palpitations. No LE edema or calf tenderness. No other complaints.     12/31: less dyspnea, less cough, feeling better gradually. remains on iv solumedrol IC, LABA, NABILA    ROS: Multisystem review is comprehensively negative x 10 systems except as above    Physical Exam:  T(F): 97.3 (30 Dec 2021 08:05), Max: 98.5 (29 Dec 2021 20:16)  HR: 70 (30 Dec 2021 08:05) (70 - 76)  BP: 116/61 (30 Dec 2021 08:05) (116/61 - 120/65)  RR: 18 (30 Dec 2021 08:05) (18 - 18)  SpO2: 93% (30 Dec 2021 08:05) (91% - 93%)    No acute distress  NCAT PERRL EOMI  Neck Supple No JVD  Chest Normal resp effort at rest, diffuse wheeze B/L  CVS: S1 S2 normal RRR  Abd: +BS  soft NT ND   Ext: No calf tenderness  Skin: Warm, intact  Neuro: A+OX3, CN intact, no focal deficits  Mood: Calm, pleasant    Labs:    CBC 12/30                        17.4   8.21  )----------( 152             53.0     BMP 12/30    138  |  102  |  46  ----------------------<  201  3.6   |   33   |  0.96    Ca 10.4  Mg 2.1    TPro  6.9  /  Alb  2.8  /  TBili  0.6  /  DBili  x   /  AST  25  /  ALT  41  /  AlkPhos  121    PT: 19.8 sec;   INR: 1.75 ratio   PTT: 35.5 sec    Micro:  COVID19 PCR 12/29: Neg  Flu PCR 12/29: Neg  RSV PCR 12/29: Neg    Imaging:  CXR 12/29: Frontal expiratory view of the chest shows the heart to be borderline in size. Sternal wires and left cardiac pacemaker are present. The lungs are clear and there is no evidence of pneumothorax nor pleural effusion.    Cardiac Testing:  Tele reviewed    Meds:  MEDICATIONS  (STANDING):  ALBUTerol    90 MICROgram(s) HFA Inhaler 2 Puff(s) Inhalation four times a day  atorvastatin 80 milliGRAM(s) Oral at bedtime  azithromycin   Tablet 250 milliGRAM(s) Oral daily  budesonide 160 MICROgram(s)/formoterol 4.5 MICROgram(s) Inhaler 2 Puff(s) Inhalation two times a day  DULoxetine 60 milliGRAM(s) Oral daily  insulin glargine Injectable (LANTUS) 30 Unit(s) SubCutaneous at bedtime  insulin lispro (ADMELOG) corrective regimen sliding scale   SubCutaneous three times a day before meals  insulin lispro (ADMELOG) corrective regimen sliding scale   SubCutaneous at bedtime  insulin lispro Injectable (ADMELOG) 4 Unit(s) SubCutaneous three times a day before meals  methylPREDNISolone sodium succinate Injectable 80 milliGRAM(s) IV Push two times a day  metoprolol succinate ER 50 milliGRAM(s) Oral two times a day  montelukast 10 milliGRAM(s) Oral daily  rivaroxaban 20 milliGRAM(s) Oral with dinner  spironolactone 25 milliGRAM(s) Oral daily  torsemide 40 milliGRAM(s) Oral daily    MEDICATIONS  (PRN):  guaiFENesin ER 1200 milliGRAM(s) Oral every 12 hours PRN Cough  ondansetron Injectable 4 milliGRAM(s) IV Push every 6 hours PRN Nausea   Chief Complaint: Dyspnea, cough    Interval Hx: Patient reports improvement since initial presentation though still with somewhat frequent dry cough, wheeze, dyspnea with minimal exertion. No chest pain or tightness. No dizziness, lightheadedness or palpitations. No LE edema or calf tenderness. No other complaints.     12/31: less dyspnea, less cough, feeling better gradually. remains on iv solumedrol IC, LABA, NABILA. peak flow requested.     ROS: Multisystem review is comprehensively negative x 10 systems except as above    Physical Exam:  T(F): 97.3 (30 Dec 2021 08:05), Max: 98.5 (29 Dec 2021 20:16)  HR: 70 (30 Dec 2021 08:05) (70 - 76)  BP: 116/61 (30 Dec 2021 08:05) (116/61 - 120/65)  RR: 18 (30 Dec 2021 08:05) (18 - 18)  SpO2: 93% (30 Dec 2021 08:05) (91% - 93%)    No acute distress  NCAT PERRL EOMI  Neck Supple No JVD  Chest Normal resp effort at rest, diffuse wheeze B/L  CVS: S1 S2 normal RRR  Abd: +BS  soft NT ND   Ext: No calf tenderness  Skin: Warm, intact  Neuro: A+OX3, CN intact, no focal deficits  Mood: Calm, pleasant    Labs:    CBC 12/30                        17.4   8.21  )----------( 152             53.0     BMP 12/30    138  |  102  |  46  ----------------------<  201  3.6   |   33   |  0.96    Ca 10.4  Mg 2.1    TPro  6.9  /  Alb  2.8  /  TBili  0.6  /  DBili  x   /  AST  25  /  ALT  41  /  AlkPhos  121    PT: 19.8 sec;   INR: 1.75 ratio   PTT: 35.5 sec    Micro:  COVID19 PCR 12/29: Neg  Flu PCR 12/29: Neg  RSV PCR 12/29: Neg    Imaging:  CXR 12/29: Frontal expiratory view of the chest shows the heart to be borderline in size. Sternal wires and left cardiac pacemaker are present. The lungs are clear and there is no evidence of pneumothorax nor pleural effusion.    Cardiac Testing:  Tele reviewed    Meds:  MEDICATIONS  (STANDING):  ALBUTerol    90 MICROgram(s) HFA Inhaler 2 Puff(s) Inhalation four times a day  atorvastatin 80 milliGRAM(s) Oral at bedtime  azithromycin   Tablet 250 milliGRAM(s) Oral daily  budesonide 160 MICROgram(s)/formoterol 4.5 MICROgram(s) Inhaler 2 Puff(s) Inhalation two times a day  DULoxetine 60 milliGRAM(s) Oral daily  insulin glargine Injectable (LANTUS) 30 Unit(s) SubCutaneous at bedtime  insulin lispro (ADMELOG) corrective regimen sliding scale   SubCutaneous three times a day before meals  insulin lispro (ADMELOG) corrective regimen sliding scale   SubCutaneous at bedtime  insulin lispro Injectable (ADMELOG) 4 Unit(s) SubCutaneous three times a day before meals  methylPREDNISolone sodium succinate Injectable 80 milliGRAM(s) IV Push two times a day  metoprolol succinate ER 50 milliGRAM(s) Oral two times a day  montelukast 10 milliGRAM(s) Oral daily  rivaroxaban 20 milliGRAM(s) Oral with dinner  spironolactone 25 milliGRAM(s) Oral daily  torsemide 40 milliGRAM(s) Oral daily    MEDICATIONS  (PRN):  guaiFENesin ER 1200 milliGRAM(s) Oral every 12 hours PRN Cough  ondansetron Injectable 4 milliGRAM(s) IV Push every 6 hours PRN Nausea   Chief Complaint: Dyspnea, cough    Interval Hx: Patient reports improvement since initial presentation though still with somewhat frequent dry cough, wheeze, dyspnea with minimal exertion. No chest pain or tightness. No dizziness, lightheadedness or palpitations. No LE edema or calf tenderness. No other complaints.     12/31: less dyspnea, less cough, feeling better gradually. remains on iv solumedrol IC, LABA, NABILA. peak flow requested. 93% on 2L.     ROS: Multisystem review is comprehensively negative x 10 systems except as above    Physical Exam:  T(F): 97.3 (30 Dec 2021 08:05), Max: 98.5 (29 Dec 2021 20:16)  HR: 70 (30 Dec 2021 08:05) (70 - 76)  BP: 116/61 (30 Dec 2021 08:05) (116/61 - 120/65)  RR: 18 (30 Dec 2021 08:05) (18 - 18)  SpO2: 93% (30 Dec 2021 08:05) (91% - 93%)    No acute distress  NCAT PERRL EOMI  Neck Supple No JVD  Chest Normal resp effort at rest, diffuse wheeze B/L  CVS: S1 S2 normal RRR  Abd: +BS  soft NT ND   Ext: No calf tenderness  Skin: Warm, intact  Neuro: A+OX3, CN intact, no focal deficits  Mood: Calm, pleasant    Labs:    CBC 12/30                        17.4   8.21  )----------( 152             53.0     BMP 12/30    138  |  102  |  46  ----------------------<  201  3.6   |   33   |  0.96    Ca 10.4  Mg 2.1    TPro  6.9  /  Alb  2.8  /  TBili  0.6  /  DBili  x   /  AST  25  /  ALT  41  /  AlkPhos  121    PT: 19.8 sec;   INR: 1.75 ratio   PTT: 35.5 sec    Micro:  COVID19 PCR 12/29: Neg  Flu PCR 12/29: Neg  RSV PCR 12/29: Neg    Imaging:  CXR 12/29: Frontal expiratory view of the chest shows the heart to be borderline in size. Sternal wires and left cardiac pacemaker are present. The lungs are clear and there is no evidence of pneumothorax nor pleural effusion.    Cardiac Testing:  Tele reviewed    Meds:  MEDICATIONS  (STANDING):  ALBUTerol    90 MICROgram(s) HFA Inhaler 2 Puff(s) Inhalation four times a day  atorvastatin 80 milliGRAM(s) Oral at bedtime  azithromycin   Tablet 250 milliGRAM(s) Oral daily  budesonide 160 MICROgram(s)/formoterol 4.5 MICROgram(s) Inhaler 2 Puff(s) Inhalation two times a day  DULoxetine 60 milliGRAM(s) Oral daily  insulin glargine Injectable (LANTUS) 30 Unit(s) SubCutaneous at bedtime  insulin lispro (ADMELOG) corrective regimen sliding scale   SubCutaneous three times a day before meals  insulin lispro (ADMELOG) corrective regimen sliding scale   SubCutaneous at bedtime  insulin lispro Injectable (ADMELOG) 4 Unit(s) SubCutaneous three times a day before meals  methylPREDNISolone sodium succinate Injectable 80 milliGRAM(s) IV Push two times a day  metoprolol succinate ER 50 milliGRAM(s) Oral two times a day  montelukast 10 milliGRAM(s) Oral daily  rivaroxaban 20 milliGRAM(s) Oral with dinner  spironolactone 25 milliGRAM(s) Oral daily  torsemide 40 milliGRAM(s) Oral daily    MEDICATIONS  (PRN):  guaiFENesin ER 1200 milliGRAM(s) Oral every 12 hours PRN Cough  ondansetron Injectable 4 milliGRAM(s) IV Push every 6 hours PRN Nausea   Chief Complaint: Dyspnea, cough    Interval Hx: Patient reports improvement since initial presentation though still with somewhat frequent dry cough, wheeze, dyspnea with minimal exertion. No chest pain or tightness. No dizziness, lightheadedness or palpitations. No LE edema or calf tenderness. No other complaints. Remains on IV solumedrol IC, LABA, NABILA. Peak flow < 300. SPO2 93% on 2L. Ordered for an extra dose of diuretic today (IV Lasix x1) in case some of her presentation/symptoms relate to her CHF diagnosis rather than asthma. Glucose poorly controlled in setting of systemic steroids. Prandial insulin increased today.     ROS: Multisystem review is comprehensively negative x 10 systems except as above    Physical Exam:  T(F): 97.4 (31 Dec 2021 08:00), Max: 98.6 (30 Dec 2021 20:22)  HR: 74 (31 Dec 2021 16:50) (73 - 78)  BP: 135/67 (31 Dec 2021 16:50) (116/64 - 135/67)  RR: 18 (31 Dec 2021 09:30) (17 - 18)  SpO2: 93% (31 Dec 2021 09:30) (92% - 93%) on 2L/min    No acute distress  NCAT PERRL EOMI  Neck Supple No JVD  Chest Normal resp effort at rest, diffuse wheeze B/L  CVS: S1 S2 normal RRR  Abd: +BS  soft NT ND   Ext: No calf tenderness  Skin: Warm, intact  Neuro: A+OX3, CN intact, no focal deficits  Mood: Calm, pleasant    Labs:    CBC 12/31                        17.5   8.94  )--------( 162             53.4     BMP 12/31    134  |  98  |  56  --------------------< 367  4.1   |  29  |  1.10    Ca 10.1  Phos 3.9  Mg 2.6    TPro  6.9  /  Alb  2.8  /  TBili  0.6  /  DBili  x   /  AST  25  /  ALT  41  /  AlkPhos  121 (12/29)    PT: 19.8 sec;   INR: 1.75 ratio   PTT: 35.5 sec (12/29)    Ddimer Neg (12/29)    A1c 9.0 (12/30)    Troponin Neg (12/29)   (12/29), no prior to compare    Micro:  COVID19 PCR 12/29: Neg  Flu PCR 12/29: Neg  RSV PCR 12/29: Neg    Imaging:  CXR 12/29: Frontal expiratory view of the chest shows the heart to be borderline in size. Sternal wires and left cardiac pacemaker are present. The lungs are clear and there is no evidence of pneumothorax nor pleural effusion.    Cardiac Testing:  Tele reviewed    Meds:  MEDICATIONS  (STANDING):  ALBUTerol    90 MICROgram(s) HFA Inhaler 2 Puff(s) Inhalation four times a day  atorvastatin 80 milliGRAM(s) Oral at bedtime  budesonide 160 MICROgram(s)/formoterol 4.5 MICROgram(s) Inhaler 2 Puff(s) Inhalation two times a day  DULoxetine 60 milliGRAM(s) Oral daily  insulin glargine Injectable (LANTUS) 30 Unit(s) SubCutaneous at bedtime  insulin lispro (ADMELOG) corrective regimen sliding scale   SubCutaneous three times a day before meals  insulin lispro (ADMELOG) corrective regimen sliding scale   SubCutaneous at bedtime  insulin lispro Injectable (ADMELOG) 6 Unit(s) SubCutaneous three times a day before meals  methylPREDNISolone sodium succinate Injectable 80 milliGRAM(s) IV Push two times a day  metoprolol succinate ER 50 milliGRAM(s) Oral two times a day  montelukast 10 milliGRAM(s) Oral daily  rivaroxaban 20 milliGRAM(s) Oral with dinner  spironolactone 25 milliGRAM(s) Oral daily    MEDICATIONS  (PRN):  guaiFENesin ER 1200 milliGRAM(s) Oral every 12 hours PRN Cough  ondansetron Injectable 4 milliGRAM(s) IV Push every 6 hours PRN Nausea     Chief Complaint: Dyspnea, cough    Interval Hx: Patient reports improvement since initial presentation though still with somewhat frequent dry cough, wheeze, dyspnea with minimal exertion and O2 requirement of 2L/min. Peak flow < 300. No chest pain or tightness. No dizziness, lightheadedness or palpitations. No LE edema or calf tenderness. No other complaints. Remains on IV solumedrol IC, LABA, NABILA. Ordered for an extra dose of diuretic today (IV Lasix x1) in case some of her presentation/symptoms relate to her CHF diagnosis rather than asthma. Glucose poorly controlled in setting of systemic steroids. Prandial insulin increased today.     ROS: Multisystem review is comprehensively negative x 10 systems except as above    Physical Exam:  T(F): 97.4 (31 Dec 2021 08:00), Max: 98.6 (30 Dec 2021 20:22)  HR: 74 (31 Dec 2021 16:50) (73 - 78)  BP: 135/67 (31 Dec 2021 16:50) (116/64 - 135/67)  RR: 18 (31 Dec 2021 09:30) (17 - 18)  SpO2: 93% (31 Dec 2021 09:30) (92% - 93%) on 2L/min    No acute distress  NCAT PERRL EOMI  Neck Supple No JVD  Chest Normal resp effort at rest, diffuse wheeze B/L  CVS: S1 S2 normal RRR  Abd: +BS  soft NT ND   Ext: No calf tenderness  Skin: Warm, intact  Neuro: A+OX3, CN intact, no focal deficits  Mood: Calm, pleasant    Labs:    CBC 12/31                        17.5   8.94  )--------( 162             53.4     BMP 12/31    134  |  98  |  56  --------------------< 367  4.1   |  29  |  1.10    Ca 10.1  Phos 3.9  Mg 2.6    TPro  6.9  /  Alb  2.8  /  TBili  0.6  /  DBili  x   /  AST  25  /  ALT  41  /  AlkPhos  121 (12/29)    PT: 19.8 sec;   INR: 1.75 ratio   PTT: 35.5 sec (12/29)    Ddimer Neg (12/29)    A1c 9.0 (12/30)    Troponin Neg (12/29)   (12/29), no prior to compare    Micro:  COVID19 PCR 12/29: Neg  Flu PCR 12/29: Neg  RSV PCR 12/29: Neg    Imaging:  CXR 12/29: Frontal expiratory view of the chest shows the heart to be borderline in size. Sternal wires and left cardiac pacemaker are present. The lungs are clear and there is no evidence of pneumothorax nor pleural effusion.    Cardiac Testing:  Tele reviewed    Meds:  MEDICATIONS  (STANDING):  ALBUTerol    90 MICROgram(s) HFA Inhaler 2 Puff(s) Inhalation four times a day  atorvastatin 80 milliGRAM(s) Oral at bedtime  budesonide 160 MICROgram(s)/formoterol 4.5 MICROgram(s) Inhaler 2 Puff(s) Inhalation two times a day  DULoxetine 60 milliGRAM(s) Oral daily  insulin glargine Injectable (LANTUS) 30 Unit(s) SubCutaneous at bedtime  insulin lispro (ADMELOG) corrective regimen sliding scale   SubCutaneous three times a day before meals  insulin lispro (ADMELOG) corrective regimen sliding scale   SubCutaneous at bedtime  insulin lispro Injectable (ADMELOG) 6 Unit(s) SubCutaneous three times a day before meals  methylPREDNISolone sodium succinate Injectable 80 milliGRAM(s) IV Push two times a day  metoprolol succinate ER 50 milliGRAM(s) Oral two times a day  montelukast 10 milliGRAM(s) Oral daily  rivaroxaban 20 milliGRAM(s) Oral with dinner  spironolactone 25 milliGRAM(s) Oral daily    MEDICATIONS  (PRN):  guaiFENesin ER 1200 milliGRAM(s) Oral every 12 hours PRN Cough  ondansetron Injectable 4 milliGRAM(s) IV Push every 6 hours PRN Nausea

## 2022-01-01 LAB
ANION GAP SERPL CALC-SCNC: 5 MMOL/L — SIGNIFICANT CHANGE UP (ref 5–17)
BUN SERPL-MCNC: 54 MG/DL — HIGH (ref 7–23)
CALCIUM SERPL-MCNC: 10.1 MG/DL — SIGNIFICANT CHANGE UP (ref 8.5–10.1)
CHLORIDE SERPL-SCNC: 98 MMOL/L — SIGNIFICANT CHANGE UP (ref 96–108)
CO2 SERPL-SCNC: 33 MMOL/L — HIGH (ref 22–31)
CREAT SERPL-MCNC: 1.04 MG/DL — SIGNIFICANT CHANGE UP (ref 0.5–1.3)
GLUCOSE BLDC GLUCOMTR-MCNC: 292 MG/DL — HIGH (ref 70–99)
GLUCOSE BLDC GLUCOMTR-MCNC: 371 MG/DL — HIGH (ref 70–99)
GLUCOSE BLDC GLUCOMTR-MCNC: 412 MG/DL — HIGH (ref 70–99)
GLUCOSE SERPL-MCNC: 242 MG/DL — HIGH (ref 70–99)
MAGNESIUM SERPL-MCNC: 3 MG/DL — HIGH (ref 1.6–2.6)
PHOSPHATE SERPL-MCNC: 3.7 MG/DL — SIGNIFICANT CHANGE UP (ref 2.5–4.5)
POTASSIUM SERPL-MCNC: 4.3 MMOL/L — SIGNIFICANT CHANGE UP (ref 3.5–5.3)
POTASSIUM SERPL-SCNC: 4.3 MMOL/L — SIGNIFICANT CHANGE UP (ref 3.5–5.3)
SODIUM SERPL-SCNC: 136 MMOL/L — SIGNIFICANT CHANGE UP (ref 135–145)

## 2022-01-01 PROCEDURE — 99232 SBSQ HOSP IP/OBS MODERATE 35: CPT

## 2022-01-01 PROCEDURE — 99233 SBSQ HOSP IP/OBS HIGH 50: CPT

## 2022-01-01 RX ORDER — INSULIN GLARGINE 100 [IU]/ML
35 INJECTION, SOLUTION SUBCUTANEOUS AT BEDTIME
Refills: 0 | Status: DISCONTINUED | OUTPATIENT
Start: 2022-01-01 | End: 2022-01-02

## 2022-01-01 RX ADMIN — Medication 8: at 21:58

## 2022-01-01 RX ADMIN — Medication 6 UNIT(S): at 17:15

## 2022-01-01 RX ADMIN — Medication 80 MILLIGRAM(S): at 05:56

## 2022-01-01 RX ADMIN — ALBUTEROL 2 PUFF(S): 90 AEROSOL, METERED ORAL at 15:11

## 2022-01-01 RX ADMIN — RIVAROXABAN 20 MILLIGRAM(S): KIT at 17:14

## 2022-01-01 RX ADMIN — ALBUTEROL 2 PUFF(S): 90 AEROSOL, METERED ORAL at 08:27

## 2022-01-01 RX ADMIN — Medication 10: at 12:35

## 2022-01-01 RX ADMIN — Medication 60 MILLIGRAM(S): at 21:58

## 2022-01-01 RX ADMIN — ALBUTEROL 2 PUFF(S): 90 AEROSOL, METERED ORAL at 11:22

## 2022-01-01 RX ADMIN — INSULIN GLARGINE 35 UNIT(S): 100 INJECTION, SOLUTION SUBCUTANEOUS at 21:59

## 2022-01-01 RX ADMIN — Medication 50 MILLIGRAM(S): at 05:56

## 2022-01-01 RX ADMIN — SPIRONOLACTONE 25 MILLIGRAM(S): 25 TABLET, FILM COATED ORAL at 10:40

## 2022-01-01 RX ADMIN — Medication 6 UNIT(S): at 08:20

## 2022-01-01 RX ADMIN — Medication 6: at 17:15

## 2022-01-01 RX ADMIN — ALBUTEROL 2 PUFF(S): 90 AEROSOL, METERED ORAL at 20:08

## 2022-01-01 RX ADMIN — BUDESONIDE AND FORMOTEROL FUMARATE DIHYDRATE 2 PUFF(S): 160; 4.5 AEROSOL RESPIRATORY (INHALATION) at 08:28

## 2022-01-01 RX ADMIN — Medication 40 MILLIGRAM(S): at 10:41

## 2022-01-01 RX ADMIN — Medication 50 MILLIGRAM(S): at 21:50

## 2022-01-01 RX ADMIN — ATORVASTATIN CALCIUM 80 MILLIGRAM(S): 80 TABLET, FILM COATED ORAL at 21:50

## 2022-01-01 RX ADMIN — Medication 6 UNIT(S): at 12:35

## 2022-01-01 RX ADMIN — DULOXETINE HYDROCHLORIDE 60 MILLIGRAM(S): 30 CAPSULE, DELAYED RELEASE ORAL at 12:35

## 2022-01-01 RX ADMIN — MONTELUKAST 10 MILLIGRAM(S): 4 TABLET, CHEWABLE ORAL at 12:35

## 2022-01-01 RX ADMIN — BUDESONIDE AND FORMOTEROL FUMARATE DIHYDRATE 2 PUFF(S): 160; 4.5 AEROSOL RESPIRATORY (INHALATION) at 20:10

## 2022-01-01 RX ADMIN — Medication 4: at 08:21

## 2022-01-01 RX ADMIN — Medication 60 MILLIGRAM(S): at 10:38

## 2022-01-01 NOTE — PROGRESS NOTE ADULT - ASSESSMENT
81 year-old woman with DM, CAD s/p PCI, CABG 2018, chronic systolic CHF, afib on Xarelto, s/p PPM, asthma, chronic respiratory failure on home O2 2L/min, last asthma exacerbation ~ 3 weeks prior to this presentation, was treated with course of doxycycline and prednisone, had prednisone tapered and once she was down to 10mg prednisone, she re-developed dyspnea, wheeze and cough so she came for evaluation. CXR negative. COVID, flu and RSV PCR negative. Started on IV steroids, continued on IC/LABA, standing NABILA, admitted to Medicine.     #Acute exacerbation of asthma  As suggested by symptoms triggered by tapering of her prednisone, as per patient. Denies tobacco use hx. Denies known hx of emphysema/COPD. States her last PFTs were approx 1 year ago in [place]. She has chronic respiratory failure for which she uses home O2 but she attributes that moreso to her cardiac issues (CHF) than her asthma. CXR neg. COVID, flu and RSV PCR negative. Appreciate input from Pulmonary Medicine. On IV steroids, IC, LABA, NABILA, and now improving gradually though peak flow still < 300.   - Continue Solumedrol, now reduced to 60iv BID per pulm  - Continue IC/LABA/NABILA  - Can DC antibiotics as per Pulmonary Medicine  - Continue to check peak flow and trend for improvement    #Chronic systolic CHF  -trop neg,   -Per patient, last echo 3 months ago in [place], approx LVEF 45-50%. Currently appears euvolemic. On PO torsemide and spironolactone. Toprol XL. Not on ACE or ARB.   - s/p dose of iv lasix on 12/31  - Resume PO torsemide and spironolactone today  - Continue Toprol XL  - Unclear why patient is not on ACE or ARB or Entresto, will discuss with patient, likely defer to her outside cardiologist  - Continue Is and Os, daily wt (requested RN re-weigh patient today and compare to 12/29)    #Afib  Chronic, stable, rate WNL  - Continue Toprol  - Continue Xarelto    #Type II DM  A1c 9.0. Hyperglycemic in setting of steroids and underlying suboptimal control as evidenced by her elevated Hgb A1c. On Tresiba, Trulicity and Jardiance as outpatient. Here she has been placed on Lantus and Lispro. Remains hyperglycemic.   - Continue Lantus and Lispro, up titrating both prn  - Continue to check glucose TID AC and HS    #Mild hyponatremia, improving  In setting of underlying CHF w/ reduced EF. Continue to manage as described above.  - Trend Na     #DVT px: On Xarelto for afib  #Code status: Full  #Dispo: Anticipate DC to home when clinically improved, possibly 24-48hrs, d/c tele

## 2022-01-01 NOTE — PROGRESS NOTE ADULT - SUBJECTIVE AND OBJECTIVE BOX
Subjective:  feeling better  still has cough    MEDICATIONS  (STANDING):  ALBUTerol    90 MICROgram(s) HFA Inhaler 2 Puff(s) Inhalation four times a day  atorvastatin 80 milliGRAM(s) Oral at bedtime  budesonide 160 MICROgram(s)/formoterol 4.5 MICROgram(s) Inhaler 2 Puff(s) Inhalation two times a day  dextrose 40% Gel 15 Gram(s) Oral once  dextrose 5%. 1000 milliLiter(s) (50 mL/Hr) IV Continuous <Continuous>  dextrose 5%. 1000 milliLiter(s) (100 mL/Hr) IV Continuous <Continuous>  dextrose 50% Injectable 25 Gram(s) IV Push once  dextrose 50% Injectable 12.5 Gram(s) IV Push once  dextrose 50% Injectable 25 Gram(s) IV Push once  DULoxetine 60 milliGRAM(s) Oral daily  glucagon  Injectable 1 milliGRAM(s) IntraMuscular once  glucagon  Injectable 1 milliGRAM(s) IntraMuscular once  insulin glargine Injectable (LANTUS) 30 Unit(s) SubCutaneous at bedtime  insulin lispro (ADMELOG) corrective regimen sliding scale   SubCutaneous three times a day before meals  insulin lispro (ADMELOG) corrective regimen sliding scale   SubCutaneous at bedtime  insulin lispro Injectable (ADMELOG) 6 Unit(s) SubCutaneous three times a day before meals  methylPREDNISolone sodium succinate Injectable 80 milliGRAM(s) IV Push two times a day  metoprolol succinate ER 50 milliGRAM(s) Oral two times a day  montelukast 10 milliGRAM(s) Oral daily  rivaroxaban 20 milliGRAM(s) Oral with dinner  spironolactone 25 milliGRAM(s) Oral daily  torsemide 40 milliGRAM(s) Oral daily    MEDICATIONS  (PRN):  guaiFENesin ER 1200 milliGRAM(s) Oral every 12 hours PRN Cough  ondansetron Injectable 4 milliGRAM(s) IV Push every 6 hours PRN Nausea      Allergies    penicillin (Swelling)    Intolerances        REVIEW OF SYSTEMS:    CONSTITUTIONAL:  As per HPI.  HEENT:  Eyes:  No diplopia or blurred vision. ENT:  No earache, sore throat or runny nose.  CARDIOVASCULAR:  No pressure, squeezing, tightness, heaviness or aching about the chest, neck, axilla or epigastrium.  RESPIRATORY:  wheezing  GASTROINTESTINAL:  No nausea, vomiting or diarrhea.  GENITOURINARY:  No dysuria, frequency or urgency.  MUSCULOSKELETAL:  no joint pain, deformity, tenderness  EXTREMITIES: no clubbing cyanosis,edema  SKIN:  No change in skin, hair or nails.  NEUROLOGIC:  No paresthesias, fasciculations, seizures or weakness.  PSYCHIATRIC:  No disorder of thought or mood.  ENDOCRINE:  No heat or cold intolerance, polyuria or polydipsia.  HEMATOLOGICAL:  No easy bruising or bleedings:    Vital Signs Last 24 Hrs  T(C): 36.3 (31 Dec 2021 20:45), Max: 36.3 (31 Dec 2021 20:45)  T(F): 97.3 (31 Dec 2021 20:45), Max: 97.3 (31 Dec 2021 20:45)  HR: 70 (01 Jan 2022 05:59) (70 - 78)  BP: 129/63 (01 Jan 2022 05:59) (120/64 - 143/57)  BP(mean): --  RR: 18 (31 Dec 2021 20:45) (18 - 18)  SpO2: 92% (31 Dec 2021 20:45) (92% - 93%)    PHYSICAL EXAMINATION:  SKIN: no rashes  HEAD: NC/AT  EYES: PERRLA, EOMI  EARS: TM's intact  NOSE: no abnormalities  NECK:  Supple. No lymphadenopathy. Jugular venous pressure not elevated. Carotids equal.   HEART:  S1S2 reg  CHEST:  bilat exp ronchi  ABDOMEN:  Soft and nontender.   EXTREMITIES:  no C/C/E  NEURO: AAO x 3, no focal deficts       LABS:                        17.5   8.94  )-----------( 162      ( 31 Dec 2021 08:07 )             53.4     12-31    134<L>  |  98  |  56<H>  ----------------------------<  367<H>  4.1   |  29  |  1.10    Ca    10.1      31 Dec 2021 12:16  Phos  3.9     12-31  Mg     2.6     12-31            RADIOLOGY & ADDITIONAL TESTS:

## 2022-01-01 NOTE — PROGRESS NOTE ADULT - ASSESSMENT
improving  cont symbicort/albuterol  cxr shows no pneumonia. off abx  will decrease steroids 60 mg bid; follow bgm's  has mild polycythemia  dvt proph

## 2022-01-01 NOTE — PROGRESS NOTE ADULT - SUBJECTIVE AND OBJECTIVE BOX
HOSPITALIST ATTENDING PROGRESS NOTE    Chart and meds reviewed.  Patient seen and examined.    CC: asthma    Subjective: Reports breathing is improving but still with significant cough with deep breathing. Reports uses 2L home O2 with sleep and for a few hours during the day.    All other systems reviewed and found to be negative with the exception of what has been described above.    MEDICATIONS  (STANDING):  ALBUTerol    90 MICROgram(s) HFA Inhaler 2 Puff(s) Inhalation four times a day  atorvastatin 80 milliGRAM(s) Oral at bedtime  budesonide 160 MICROgram(s)/formoterol 4.5 MICROgram(s) Inhaler 2 Puff(s) Inhalation two times a day  dextrose 40% Gel 15 Gram(s) Oral once  dextrose 5%. 1000 milliLiter(s) (50 mL/Hr) IV Continuous <Continuous>  dextrose 5%. 1000 milliLiter(s) (100 mL/Hr) IV Continuous <Continuous>  dextrose 50% Injectable 25 Gram(s) IV Push once  dextrose 50% Injectable 12.5 Gram(s) IV Push once  dextrose 50% Injectable 25 Gram(s) IV Push once  DULoxetine 60 milliGRAM(s) Oral daily  glucagon  Injectable 1 milliGRAM(s) IntraMuscular once  glucagon  Injectable 1 milliGRAM(s) IntraMuscular once  insulin glargine Injectable (LANTUS) 35 Unit(s) SubCutaneous at bedtime  insulin lispro (ADMELOG) corrective regimen sliding scale   SubCutaneous three times a day before meals  insulin lispro (ADMELOG) corrective regimen sliding scale   SubCutaneous at bedtime  insulin lispro Injectable (ADMELOG) 6 Unit(s) SubCutaneous three times a day before meals  methylPREDNISolone sodium succinate Injectable 60 milliGRAM(s) IV Push two times a day  metoprolol succinate ER 50 milliGRAM(s) Oral two times a day  montelukast 10 milliGRAM(s) Oral daily  rivaroxaban 20 milliGRAM(s) Oral with dinner  spironolactone 25 milliGRAM(s) Oral daily  torsemide 40 milliGRAM(s) Oral daily    MEDICATIONS  (PRN):  guaiFENesin ER 1200 milliGRAM(s) Oral every 12 hours PRN Cough  ondansetron Injectable 4 milliGRAM(s) IV Push every 6 hours PRN Nausea      VITALS:  T(F): 97.9 (01-01-22 @ 14:08), Max: 97.9 (01-01-22 @ 14:08)  HR: 73 (01-01-22 @ 14:08) (70 - 76)  BP: 120/88 (01-01-22 @ 14:08) (120/88 - 143/57)  RR: 16 (01-01-22 @ 14:08) (16 - 18)  SpO2: 96% (01-01-22 @ 14:08) (92% - 96%)  Wt(kg): --    I&O's Summary    31 Dec 2021 07:01  -  01 Jan 2022 07:00  --------------------------------------------------------  IN: 0 mL / OUT: 3150 mL / NET: -3150 mL    01 Jan 2022 07:01  -  01 Jan 2022 14:51  --------------------------------------------------------  IN: 0 mL / OUT: 450 mL / NET: -450 mL        CAPILLARY BLOOD GLUCOSE      POCT Blood Glucose.: 371 mg/dL (01 Jan 2022 12:24)  POCT Blood Glucose.: 229 mg/dL (01 Jan 2022 08:19)  POCT Blood Glucose.: 302 mg/dL (31 Dec 2021 21:49)  POCT Blood Glucose.: 269 mg/dL (31 Dec 2021 16:53)      PHYSICAL EXAM:  Gen: NAD  HEENT:  pupils equal and reactive, EOMI, no oropharyngeal lesions, erythema, exudates, oral thrush  NECK:   supple, no carotid bruits, no palpable lymph nodes, no thyromegaly  CV:  +S1, +S2, regular, no murmurs or rubs  RESP:   lungs clear to auscultation bilaterally, no wheezing, rales, rhonchi, good air entry bilaterally  BREAST:  not examined  GI:  abdomen soft, non-tender, non-distended, normal BS, no bruits, no abdominal masses, no palpable masses  RECTAL:  not examined  :  not examined  MSK:   normal muscle tone, no atrophy, no rigidity, no contractions  EXT:  no clubbing, no cyanosis, no edema, no calf pain, swelling or erythema  VASCULAR:  pulses equal and symmetric in the upper and lower extremities  NEURO:  AAOX3, no focal neurological deficits, follows all commands, able to move extremities spontaneously  SKIN:  no ulcers, lesions or rashes    LABS:                            17.5   8.94  )-----------( 162      ( 31 Dec 2021 08:07 )             53.4     01-01    136  |  98  |  54<H>  ----------------------------<  242<H>  4.3   |  33<H>  |  1.04    Ca    10.1      01 Jan 2022 08:38  Phos  3.7     01-01  Mg     3.0     01-01    CULTURES:  COVID19 PCR 12/29: Neg  Flu PCR 12/29: Neg  RSV PCR 12/29: Neg    Additional results/Imaging, I have personally reviewed:  CXR 12/29: Frontal expiratory view of the chest shows the heart to be borderline in size. Sternal wires and left cardiac pacemaker are present. The lungs are clear and there is no evidence of pneumothorax nor pleural effusion.    Telemetry, personally reviewed:  1/1/22: Paced, d/c tele

## 2022-01-02 LAB
GLUCOSE BLDC GLUCOMTR-MCNC: 268 MG/DL — HIGH (ref 70–99)
GLUCOSE BLDC GLUCOMTR-MCNC: 279 MG/DL — HIGH (ref 70–99)
GLUCOSE BLDC GLUCOMTR-MCNC: 290 MG/DL — HIGH (ref 70–99)
GLUCOSE BLDC GLUCOMTR-MCNC: 330 MG/DL — HIGH (ref 70–99)

## 2022-01-02 PROCEDURE — 99233 SBSQ HOSP IP/OBS HIGH 50: CPT

## 2022-01-02 PROCEDURE — 99232 SBSQ HOSP IP/OBS MODERATE 35: CPT

## 2022-01-02 RX ORDER — INSULIN GLARGINE 100 [IU]/ML
40 INJECTION, SOLUTION SUBCUTANEOUS AT BEDTIME
Refills: 0 | Status: DISCONTINUED | OUTPATIENT
Start: 2022-01-02 | End: 2022-01-05

## 2022-01-02 RX ADMIN — BUDESONIDE AND FORMOTEROL FUMARATE DIHYDRATE 2 PUFF(S): 160; 4.5 AEROSOL RESPIRATORY (INHALATION) at 07:32

## 2022-01-02 RX ADMIN — MONTELUKAST 10 MILLIGRAM(S): 4 TABLET, CHEWABLE ORAL at 11:39

## 2022-01-02 RX ADMIN — Medication 6: at 12:39

## 2022-01-02 RX ADMIN — BUDESONIDE AND FORMOTEROL FUMARATE DIHYDRATE 2 PUFF(S): 160; 4.5 AEROSOL RESPIRATORY (INHALATION) at 22:20

## 2022-01-02 RX ADMIN — Medication 50 MILLIGRAM(S): at 21:47

## 2022-01-02 RX ADMIN — SPIRONOLACTONE 25 MILLIGRAM(S): 25 TABLET, FILM COATED ORAL at 11:38

## 2022-01-02 RX ADMIN — Medication 6 UNIT(S): at 12:39

## 2022-01-02 RX ADMIN — Medication 6: at 08:36

## 2022-01-02 RX ADMIN — Medication 60 MILLIGRAM(S): at 11:38

## 2022-01-02 RX ADMIN — Medication 6: at 17:52

## 2022-01-02 RX ADMIN — INSULIN GLARGINE 40 UNIT(S): 100 INJECTION, SOLUTION SUBCUTANEOUS at 21:47

## 2022-01-02 RX ADMIN — Medication 50 MILLIGRAM(S): at 11:40

## 2022-01-02 RX ADMIN — DULOXETINE HYDROCHLORIDE 60 MILLIGRAM(S): 30 CAPSULE, DELAYED RELEASE ORAL at 11:39

## 2022-01-02 RX ADMIN — Medication 60 MILLIGRAM(S): at 21:47

## 2022-01-02 RX ADMIN — ALBUTEROL 2 PUFF(S): 90 AEROSOL, METERED ORAL at 22:20

## 2022-01-02 RX ADMIN — ALBUTEROL 2 PUFF(S): 90 AEROSOL, METERED ORAL at 14:15

## 2022-01-02 RX ADMIN — Medication 40 MILLIGRAM(S): at 11:39

## 2022-01-02 RX ADMIN — Medication 4: at 21:47

## 2022-01-02 RX ADMIN — Medication 6 UNIT(S): at 08:36

## 2022-01-02 RX ADMIN — ALBUTEROL 2 PUFF(S): 90 AEROSOL, METERED ORAL at 07:32

## 2022-01-02 RX ADMIN — ALBUTEROL 2 PUFF(S): 90 AEROSOL, METERED ORAL at 11:12

## 2022-01-02 RX ADMIN — ATORVASTATIN CALCIUM 80 MILLIGRAM(S): 80 TABLET, FILM COATED ORAL at 21:47

## 2022-01-02 RX ADMIN — RIVAROXABAN 20 MILLIGRAM(S): KIT at 17:53

## 2022-01-02 RX ADMIN — Medication 6 UNIT(S): at 17:52

## 2022-01-02 RX ADMIN — Medication 1200 MILLIGRAM(S): at 12:38

## 2022-01-02 NOTE — PROGRESS NOTE ADULT - SUBJECTIVE AND OBJECTIVE BOX
HOSPITALIST ATTENDING PROGRESS NOTE    Chart and meds reviewed.  Patient seen and examined.    CC: asthma    Subjective: Reports breathing minimally improved. Reports typical admissions for asthma exac are >1wk.    All other systems reviewed and found to be negative with the exception of what has been described above.    MEDICATIONS  (STANDING):  ALBUTerol    90 MICROgram(s) HFA Inhaler 2 Puff(s) Inhalation four times a day  atorvastatin 80 milliGRAM(s) Oral at bedtime  budesonide 160 MICROgram(s)/formoterol 4.5 MICROgram(s) Inhaler 2 Puff(s) Inhalation two times a day  dextrose 40% Gel 15 Gram(s) Oral once  dextrose 5%. 1000 milliLiter(s) (50 mL/Hr) IV Continuous <Continuous>  dextrose 5%. 1000 milliLiter(s) (100 mL/Hr) IV Continuous <Continuous>  dextrose 50% Injectable 25 Gram(s) IV Push once  dextrose 50% Injectable 12.5 Gram(s) IV Push once  dextrose 50% Injectable 25 Gram(s) IV Push once  DULoxetine 60 milliGRAM(s) Oral daily  glucagon  Injectable 1 milliGRAM(s) IntraMuscular once  glucagon  Injectable 1 milliGRAM(s) IntraMuscular once  insulin glargine Injectable (LANTUS) 40 Unit(s) SubCutaneous at bedtime  insulin lispro (ADMELOG) corrective regimen sliding scale   SubCutaneous three times a day before meals  insulin lispro (ADMELOG) corrective regimen sliding scale   SubCutaneous at bedtime  insulin lispro Injectable (ADMELOG) 6 Unit(s) SubCutaneous three times a day before meals  methylPREDNISolone sodium succinate Injectable 60 milliGRAM(s) IV Push two times a day  metoprolol succinate ER 50 milliGRAM(s) Oral two times a day  montelukast 10 milliGRAM(s) Oral daily  rivaroxaban 20 milliGRAM(s) Oral with dinner  spironolactone 25 milliGRAM(s) Oral daily  torsemide 40 milliGRAM(s) Oral daily    MEDICATIONS  (PRN):  guaiFENesin ER 1200 milliGRAM(s) Oral every 12 hours PRN Cough  ondansetron Injectable 4 milliGRAM(s) IV Push every 6 hours PRN Nausea      VITALS:  T(F): 98.7 (01-02-22 @ 09:10), Max: 98.7 (01-02-22 @ 09:10)  HR: 74 (01-02-22 @ 11:14) (70 - 77)  BP: 130/73 (01-02-22 @ 09:10) (105/55 - 135/66)  RR: 18 (01-02-22 @ 09:10) (16 - 18)  SpO2: 97% (01-02-22 @ 09:10) (93% - 97%)  Wt(kg): --    I&O's Summary    01 Jan 2022 07:01  -  02 Jan 2022 07:00  --------------------------------------------------------  IN: 0 mL / OUT: 450 mL / NET: -450 mL    02 Jan 2022 07:01  -  02 Jan 2022 12:54  --------------------------------------------------------  IN: 510 mL / OUT: 0 mL / NET: 510 mL        CAPILLARY BLOOD GLUCOSE      POCT Blood Glucose.: 279 mg/dL (02 Jan 2022 12:07)  POCT Blood Glucose.: 290 mg/dL (02 Jan 2022 07:45)  POCT Blood Glucose.: 412 mg/dL (01 Jan 2022 21:49)  POCT Blood Glucose.: 292 mg/dL (01 Jan 2022 16:44)      PHYSICAL EXAM:  Gen: NAD  HEENT:  pupils equal and reactive, EOMI, no oropharyngeal lesions, erythema, exudates, oral thrush  NECK:   supple, no carotid bruits, no palpable lymph nodes, no thyromegaly  CV:  +S1, +S2, regular, no murmurs or rubs  RESP:   lungs clear to auscultation bilaterally, rales, rhonchi, good air entry bilaterally, + b/l exp wheeze  BREAST:  not examined  GI:  abdomen soft, non-tender, non-distended, normal BS, no bruits, no abdominal masses, no palpable masses  RECTAL:  not examined  :  not examined  MSK:   normal muscle tone, no atrophy, no rigidity, no contractions  EXT:  no clubbing, no cyanosis, no edema, no calf pain, swelling or erythema  VASCULAR:  pulses equal and symmetric in the upper and lower extremities  NEURO:  AAOX3, no focal neurological deficits, follows all commands, able to move extremities spontaneously  SKIN:  no ulcers, lesions or rashes    LABS:        01-01    136  |  98  |  54<H>  ----------------------------<  242<H>  4.3   |  33<H>  |  1.04    Ca    10.1      01 Jan 2022 08:38  Phos  3.7     01-01  Mg     3.0     01-01    CULTURES:  COVID19 PCR 12/29: Neg  Flu PCR 12/29: Neg  RSV PCR 12/29: Neg    Additional results/Imaging, I have personally reviewed:  CXR 12/29: Frontal expiratory view of the chest shows the heart to be borderline in size. Sternal wires and left cardiac pacemaker are present. The lungs are clear and there is no evidence of pneumothorax nor pleural effusion.    Telemetry, personally reviewed:  1/1/22: Paced, d/c tele

## 2022-01-02 NOTE — PROGRESS NOTE ADULT - ASSESSMENT
improving but still has cough/wheeze  cont symbicort/albuterol/singulair  cxr shows no pneumonia. off abx  maintain steroids 60 mg bid; follow bgm's  off abx  has mild polycythemia  dvt proph- on xarelto

## 2022-01-02 NOTE — PROGRESS NOTE ADULT - SUBJECTIVE AND OBJECTIVE BOX
Subjective:  still coughing  less sob    MEDICATIONS  (STANDING):  ALBUTerol    90 MICROgram(s) HFA Inhaler 2 Puff(s) Inhalation four times a day  atorvastatin 80 milliGRAM(s) Oral at bedtime  budesonide 160 MICROgram(s)/formoterol 4.5 MICROgram(s) Inhaler 2 Puff(s) Inhalation two times a day  dextrose 40% Gel 15 Gram(s) Oral once  dextrose 5%. 1000 milliLiter(s) (50 mL/Hr) IV Continuous <Continuous>  dextrose 5%. 1000 milliLiter(s) (100 mL/Hr) IV Continuous <Continuous>  dextrose 50% Injectable 25 Gram(s) IV Push once  dextrose 50% Injectable 12.5 Gram(s) IV Push once  dextrose 50% Injectable 25 Gram(s) IV Push once  DULoxetine 60 milliGRAM(s) Oral daily  glucagon  Injectable 1 milliGRAM(s) IntraMuscular once  glucagon  Injectable 1 milliGRAM(s) IntraMuscular once  insulin glargine Injectable (LANTUS) 40 Unit(s) SubCutaneous at bedtime  insulin lispro (ADMELOG) corrective regimen sliding scale   SubCutaneous three times a day before meals  insulin lispro (ADMELOG) corrective regimen sliding scale   SubCutaneous at bedtime  insulin lispro Injectable (ADMELOG) 6 Unit(s) SubCutaneous three times a day before meals  methylPREDNISolone sodium succinate Injectable 60 milliGRAM(s) IV Push two times a day  metoprolol succinate ER 50 milliGRAM(s) Oral two times a day  montelukast 10 milliGRAM(s) Oral daily  rivaroxaban 20 milliGRAM(s) Oral with dinner  spironolactone 25 milliGRAM(s) Oral daily  torsemide 40 milliGRAM(s) Oral daily    MEDICATIONS  (PRN):  guaiFENesin ER 1200 milliGRAM(s) Oral every 12 hours PRN Cough  ondansetron Injectable 4 milliGRAM(s) IV Push every 6 hours PRN Nausea      Allergies    penicillin (Swelling)    Intolerances        REVIEW OF SYSTEMS:    CONSTITUTIONAL:  As per HPI.  HEENT:  Eyes:  No diplopia or blurred vision. ENT:  No earache, sore throat or runny nose.  CARDIOVASCULAR:  No pressure, squeezing, tightness, heaviness or aching about the chest, neck, axilla or epigastrium.  RESPIRATORY:  No cough, shortness of breath, PND or orthopnea.  GASTROINTESTINAL:  No nausea, vomiting or diarrhea.  GENITOURINARY:  No dysuria, frequency or urgency.  MUSCULOSKELETAL:  no joint pain, deformity, tenderness  EXTREMITIES: no clubbing cyanosis,edema  SKIN:  No change in skin, hair or nails.  NEUROLOGIC:  No paresthesias, fasciculations, seizures or weakness.  PSYCHIATRIC:  No disorder of thought or mood.  ENDOCRINE:  No heat or cold intolerance, polyuria or polydipsia.  HEMATOLOGICAL:  No easy bruising or bleedings:    Vital Signs Last 24 Hrs  T(C): 37.1 (02 Jan 2022 09:10), Max: 37.1 (02 Jan 2022 09:10)  T(F): 98.7 (02 Jan 2022 09:10), Max: 98.7 (02 Jan 2022 09:10)  HR: 72 (02 Jan 2022 14:15) (70 - 77)  BP: 130/73 (02 Jan 2022 09:10) (105/55 - 135/66)  BP(mean): --  RR: 18 (02 Jan 2022 09:10) (16 - 18)  SpO2: 97% (02 Jan 2022 09:10) (93% - 97%)    PHYSICAL EXAMINATION:  SKIN: no rashes  HEAD: NC/AT  EYES: PERRLA, EOMI  EARS: TM's intact  NOSE: no abnormalities  NECK:  Supple. No lymphadenopathy. Jugular venous pressure not elevated. Carotids equal.   HEART:  S1S2 reg  CHEST:  scattered exp ronchi  ABDOMEN:  Soft and nontender.   EXTREMITIES:  no C/C/E  NEURO: AAO x 3, no focal deficts       LABS:    01-01    136  |  98  |  54<H>  ----------------------------<  242<H>  4.3   |  33<H>  |  1.04    Ca    10.1      01 Jan 2022 08:38  Phos  3.7     01-01  Mg     3.0     01-01            RADIOLOGY & ADDITIONAL TESTS:

## 2022-01-03 LAB — GLUCOSE BLDC GLUCOMTR-MCNC: 239 MG/DL — HIGH (ref 70–99)

## 2022-01-03 PROCEDURE — 99232 SBSQ HOSP IP/OBS MODERATE 35: CPT

## 2022-01-03 PROCEDURE — 99233 SBSQ HOSP IP/OBS HIGH 50: CPT

## 2022-01-03 RX ORDER — SENNA PLUS 8.6 MG/1
2 TABLET ORAL ONCE
Refills: 0 | Status: COMPLETED | OUTPATIENT
Start: 2022-01-03 | End: 2022-01-03

## 2022-01-03 RX ADMIN — Medication 60 MILLIGRAM(S): at 21:40

## 2022-01-03 RX ADMIN — ALBUTEROL 2 PUFF(S): 90 AEROSOL, METERED ORAL at 13:32

## 2022-01-03 RX ADMIN — RIVAROXABAN 20 MILLIGRAM(S): KIT at 18:41

## 2022-01-03 RX ADMIN — BUDESONIDE AND FORMOTEROL FUMARATE DIHYDRATE 2 PUFF(S): 160; 4.5 AEROSOL RESPIRATORY (INHALATION) at 21:23

## 2022-01-03 RX ADMIN — BUDESONIDE AND FORMOTEROL FUMARATE DIHYDRATE 2 PUFF(S): 160; 4.5 AEROSOL RESPIRATORY (INHALATION) at 08:12

## 2022-01-03 RX ADMIN — SENNA PLUS 2 TABLET(S): 8.6 TABLET ORAL at 22:26

## 2022-01-03 RX ADMIN — Medication 6 UNIT(S): at 11:59

## 2022-01-03 RX ADMIN — ALBUTEROL 2 PUFF(S): 90 AEROSOL, METERED ORAL at 16:51

## 2022-01-03 RX ADMIN — Medication 50 MILLIGRAM(S): at 21:40

## 2022-01-03 RX ADMIN — Medication 6 UNIT(S): at 07:38

## 2022-01-03 RX ADMIN — Medication 60 MILLIGRAM(S): at 10:46

## 2022-01-03 RX ADMIN — Medication 4: at 07:37

## 2022-01-03 RX ADMIN — ATORVASTATIN CALCIUM 80 MILLIGRAM(S): 80 TABLET, FILM COATED ORAL at 21:40

## 2022-01-03 RX ADMIN — Medication 50 MILLIGRAM(S): at 10:46

## 2022-01-03 RX ADMIN — DULOXETINE HYDROCHLORIDE 60 MILLIGRAM(S): 30 CAPSULE, DELAYED RELEASE ORAL at 10:46

## 2022-01-03 RX ADMIN — INSULIN GLARGINE 40 UNIT(S): 100 INJECTION, SOLUTION SUBCUTANEOUS at 21:40

## 2022-01-03 RX ADMIN — ALBUTEROL 2 PUFF(S): 90 AEROSOL, METERED ORAL at 21:23

## 2022-01-03 RX ADMIN — Medication 6 UNIT(S): at 16:34

## 2022-01-03 RX ADMIN — Medication 4: at 11:59

## 2022-01-03 RX ADMIN — Medication 40 MILLIGRAM(S): at 10:46

## 2022-01-03 RX ADMIN — ALBUTEROL 2 PUFF(S): 90 AEROSOL, METERED ORAL at 08:13

## 2022-01-03 RX ADMIN — MONTELUKAST 10 MILLIGRAM(S): 4 TABLET, CHEWABLE ORAL at 10:46

## 2022-01-03 RX ADMIN — Medication 2: at 21:41

## 2022-01-03 RX ADMIN — Medication 2: at 16:34

## 2022-01-03 RX ADMIN — SPIRONOLACTONE 25 MILLIGRAM(S): 25 TABLET, FILM COATED ORAL at 10:46

## 2022-01-03 NOTE — PROGRESS NOTE ADULT - SUBJECTIVE AND OBJECTIVE BOX
SUBJECTIVE:    Seen and examined today.  Feeling better since admission.   Shortness of breath improved.  She still has cough and wheezing.   No fevers.  Eating well.  Ambulating to the bathroom.   Remains on NC.    ROS otherwise negative.       Vital Signs Last 24 Hrs  T(C): 36.4 (03 Jan 2022 08:56), Max: 36.6 (02 Jan 2022 20:32)  T(F): 97.6 (03 Jan 2022 08:56), Max: 97.9 (03 Jan 2022 00:41)  HR: 70 (03 Jan 2022 08:56) (70 - 84)  BP: 131/68 (03 Jan 2022 08:56) (125/70 - 131/68)  BP(mean): 85 (03 Jan 2022 08:56) (85 - 85)  RR: 16 (03 Jan 2022 08:56) (16 - 18)  SpO2: 95% (03 Jan 2022 08:56) (93% - 95%)    PHYSICAL EXAMINATION:    GENERAL APPEARANCE:  No distress. Sitting upright in bed.   HEAD: Normocephalic atraumatic   HEENT:  Mucus membranes moist. Oropharynx normal.   NECK:  Supple.  No stridor.  HEART:  Normal S1 and S2. There are no murmurs  CHEST:  She has bilateral wheezing, coughing with deep inspiration.   ABDOMEN:  Soft and nontender. Nondistended.   SKIN:  No rash or significant lesions are noted. No jaundice.  PSYCH: Normal affect.  NEURO: Alert and oriented. Responds to question appropriate. No focal deficits appreciated.       MEDICATIONS  (STANDING):  ALBUTerol    90 MICROgram(s) HFA Inhaler 2 Puff(s) Inhalation four times a day  atorvastatin 80 milliGRAM(s) Oral at bedtime  budesonide 160 MICROgram(s)/formoterol 4.5 MICROgram(s) Inhaler 2 Puff(s) Inhalation two times a day  dextrose 40% Gel 15 Gram(s) Oral once  dextrose 5%. 1000 milliLiter(s) (50 mL/Hr) IV Continuous <Continuous>  dextrose 5%. 1000 milliLiter(s) (100 mL/Hr) IV Continuous <Continuous>  dextrose 50% Injectable 25 Gram(s) IV Push once  dextrose 50% Injectable 12.5 Gram(s) IV Push once  dextrose 50% Injectable 25 Gram(s) IV Push once  DULoxetine 60 milliGRAM(s) Oral daily  glucagon  Injectable 1 milliGRAM(s) IntraMuscular once  glucagon  Injectable 1 milliGRAM(s) IntraMuscular once  insulin glargine Injectable (LANTUS) 40 Unit(s) SubCutaneous at bedtime  insulin lispro (ADMELOG) corrective regimen sliding scale   SubCutaneous three times a day before meals  insulin lispro (ADMELOG) corrective regimen sliding scale   SubCutaneous at bedtime  insulin lispro Injectable (ADMELOG) 6 Unit(s) SubCutaneous three times a day before meals  methylPREDNISolone sodium succinate Injectable 60 milliGRAM(s) IV Push two times a day  metoprolol succinate ER 50 milliGRAM(s) Oral two times a day  montelukast 10 milliGRAM(s) Oral daily  rivaroxaban 20 milliGRAM(s) Oral with dinner  spironolactone 25 milliGRAM(s) Oral daily  torsemide 40 milliGRAM(s) Oral daily    MEDICATIONS  (PRN):  guaiFENesin ER 1200 milliGRAM(s) Oral every 12 hours PRN Cough  ondansetron Injectable 4 milliGRAM(s) IV Push every 6 hours PRN Nausea      Allergies    penicillin (Swelling)    Intolerances    PROCEDURE DATE: 12/29/2021        INTERPRETATION: Chest one view    HISTORY: Chest pain    COMPARISON STUDY: None available    Frontal expiratory view of the chest shows the heart to be borderline in size. Sternal wires and left cardiac pacemaker are present.    The lungs are clear and there is no evidence of pneumothorax nor pleural effusion.    IMPRESSION:  No active pulmonary disease.    Thank you for the courtesy of this referral.

## 2022-01-03 NOTE — PROGRESS NOTE ADULT - ASSESSMENT
81 year-old woman with DM, CAD s/p PCI, CABG 2018, chronic systolic CHF, afib on Xarelto, s/p PPM, asthma, chronic respiratory failure on home O2 2L/min, last asthma exacerbation ~ 3 weeks prior to this presentation, was treated with course of doxycycline and prednisone, had prednisone tapered and once she was down to 10mg prednisone, she re-developed dyspnea, wheeze and cough so she came for evaluation. CXR negative. COVID, flu and RSV PCR negative. Started on IV steroids, continued on IC/LABA, standing NABILA, admitted to Medicine.     #Acute exacerbation of asthma  As suggested by symptoms triggered by tapering of her prednisone, as per patient. Denies tobacco use hx. Denies known hx of emphysema/COPD. States her last PFTs were approx 1 year ago in [place]. She has chronic respiratory failure for which she uses home O2 but she attributes that moreso to her cardiac issues (CHF) than her asthma. CXR neg. COVID, flu and RSV PCR negative. Appreciate input from Pulmonary Medicine. On IV steroids, IC, LABA, NABILA, and now improving gradually though peak flow still < 300.   - Continue IV Solumedrol  - Continue IC/LABA/NABILA  - Can DC antibiotics as per Pulmonary Medicine  - Continue to check peak flow and trend for improvement    #Chronic systolic CHF  -trop neg,   -Per patient, last echo 3 months ago in [place], approx LVEF 45-50%. Currently appears euvolemic. On PO torsemide and spironolactone. Toprol XL. Not on ACE or ARB.   - s/p dose of iv lasix on 12/31  - Resume PO torsemide and spironolactone 1/1  - Continue Toprol XL  - Unclear why patient is not on ACE or ARB or Entresto, will discuss with patient, likely defer to her outside cardiologist  - Continue Is and Os, daily wt (requested RN re-weigh patient today and compare to 12/29)    #Afib  Chronic, stable, rate WNL  - Continue Toprol  - Continue Xarelto    #Type II DM  A1c 9.0. Hyperglycemic in setting of steroids and underlying suboptimal control as evidenced by her elevated Hgb A1c. On Tresiba, Trulicity and Jardiance as outpatient. Here she has been placed on Lantus and Lispro. Remains hyperglycemic.   - Continue Lantus and Lispro, up titrating both prn  - Continue to check glucose TID AC and HS    #Mild hyponatremia, improving  In setting of underlying CHF w/ reduced EF. Continue to manage as described above.  - Trend Na     #DVT px: On Xarelto for afib  #Code status: Full  #Dispo: home when clinically better

## 2022-01-03 NOTE — PROGRESS NOTE ADULT - ASSESSMENT
Assessment:   81 year-old woman with a history of CAD s/p PCI, CABG 2018, chronic systolic CHF, afib on Xarelto, s/p PPM, asthma, chronic respiratory failure on home O2 2L/min presented with wheezing and cough.   --Managed for asthma exacerbation and acute on chronic resp. failure  --She is slowly improving  --CXR images personally reviewed, shows no pulmonary infiltrates.     Recommendations:   --cont symbicort/albuterol/singulair  --on systemic steroids 60 BID   --dvt proph- on xarelto    will follow

## 2022-01-03 NOTE — PROGRESS NOTE ADULT - SUBJECTIVE AND OBJECTIVE BOX
HOSPITALIST ATTENDING PROGRESS NOTE    Chart and meds reviewed.  Patient seen and examined.    CC: asthma    1/3/21- still wheezing    All other systems reviewed and found to be negative with the exception of what has been described above.    MEDICATIONS  (STANDING):  ALBUTerol    90 MICROgram(s) HFA Inhaler 2 Puff(s) Inhalation four times a day  atorvastatin 80 milliGRAM(s) Oral at bedtime  budesonide 160 MICROgram(s)/formoterol 4.5 MICROgram(s) Inhaler 2 Puff(s) Inhalation two times a day  dextrose 40% Gel 15 Gram(s) Oral once  dextrose 5%. 1000 milliLiter(s) (50 mL/Hr) IV Continuous <Continuous>  dextrose 5%. 1000 milliLiter(s) (100 mL/Hr) IV Continuous <Continuous>  dextrose 50% Injectable 25 Gram(s) IV Push once  dextrose 50% Injectable 12.5 Gram(s) IV Push once  dextrose 50% Injectable 25 Gram(s) IV Push once  DULoxetine 60 milliGRAM(s) Oral daily  glucagon  Injectable 1 milliGRAM(s) IntraMuscular once  glucagon  Injectable 1 milliGRAM(s) IntraMuscular once  insulin glargine Injectable (LANTUS) 40 Unit(s) SubCutaneous at bedtime  insulin lispro (ADMELOG) corrective regimen sliding scale   SubCutaneous three times a day before meals  insulin lispro (ADMELOG) corrective regimen sliding scale   SubCutaneous at bedtime  insulin lispro Injectable (ADMELOG) 6 Unit(s) SubCutaneous three times a day before meals  methylPREDNISolone sodium succinate Injectable 60 milliGRAM(s) IV Push two times a day  metoprolol succinate ER 50 milliGRAM(s) Oral two times a day  montelukast 10 milliGRAM(s) Oral daily  rivaroxaban 20 milliGRAM(s) Oral with dinner  spironolactone 25 milliGRAM(s) Oral daily  torsemide 40 milliGRAM(s) Oral daily    MEDICATIONS  (PRN):  guaiFENesin ER 1200 milliGRAM(s) Oral every 12 hours PRN Cough  ondansetron Injectable 4 milliGRAM(s) IV Push every 6 hours PRN Nausea    Vital Signs Last 24 Hrs  T(C): 36.4 (03 Jan 2022 08:56), Max: 36.6 (02 Jan 2022 20:32)  T(F): 97.6 (03 Jan 2022 08:56), Max: 97.9 (03 Jan 2022 00:41)  HR: 70 (03 Jan 2022 16:52) (70 - 84)  BP: 131/68 (03 Jan 2022 08:56) (125/70 - 131/68)  BP(mean): 85 (03 Jan 2022 08:56) (85 - 85)  RR: 16 (03 Jan 2022 08:56) (16 - 18)  SpO2: 95% (03 Jan 2022 16:52) (93% - 95%)    PHYSICAL EXAM:  Gen: NAD  HEENT:  pupils equal and reactive, EOMI, no oropharyngeal lesions, erythema, exudates, oral thrush  NECK:   supple, no carotid bruits, no palpable lymph nodes, no thyromegaly  CV:  +S1, +S2, regular, no murmurs or rubs  RESP:   lungs clear to auscultation bilaterally, rales, rhonchi, good air entry bilaterally, + b/l exp wheeze  BREAST:  not examined  GI:  abdomen soft, non-tender, non-distended, normal BS, no bruits, no abdominal masses, no palpable masses  RECTAL:  not examined  :  not examined  MSK:   normal muscle tone, no atrophy, no rigidity, no contractions  EXT:  no clubbing, no cyanosis, no edema, no calf pain, swelling or erythema  VASCULAR:  pulses equal and symmetric in the upper and lower extremities  NEURO:  AAOX3, no focal neurological deficits, follows all commands, able to move extremities spontaneously  SKIN:  no ulcers, lesions or rashes    LABS:  no new labs    CULTURES:  COVID19 PCR 12/29: Neg  Flu PCR 12/29: Neg  RSV PCR 12/29: Neg    Additional results/Imaging, I have personally reviewed:  CXR 12/29: Frontal expiratory view of the chest shows the heart to be borderline in size. Sternal wires and left cardiac pacemaker are present. The lungs are clear and there is no evidence of pneumothorax nor pleural effusion.    Telemetry, personally reviewed:  1/1/22: Paced, d/c tele

## 2022-01-04 LAB — SARS-COV-2 RNA SPEC QL NAA+PROBE: SIGNIFICANT CHANGE UP

## 2022-01-04 PROCEDURE — 99233 SBSQ HOSP IP/OBS HIGH 50: CPT

## 2022-01-04 PROCEDURE — 99232 SBSQ HOSP IP/OBS MODERATE 35: CPT

## 2022-01-04 RX ORDER — INSULIN LISPRO 100/ML
10 VIAL (ML) SUBCUTANEOUS
Refills: 0 | Status: DISCONTINUED | OUTPATIENT
Start: 2022-01-04 | End: 2022-01-07

## 2022-01-04 RX ADMIN — ALBUTEROL 2 PUFF(S): 90 AEROSOL, METERED ORAL at 12:23

## 2022-01-04 RX ADMIN — Medication 4: at 08:02

## 2022-01-04 RX ADMIN — Medication 6: at 12:43

## 2022-01-04 RX ADMIN — Medication 60 MILLIGRAM(S): at 21:29

## 2022-01-04 RX ADMIN — Medication 40 MILLIGRAM(S): at 11:06

## 2022-01-04 RX ADMIN — SPIRONOLACTONE 25 MILLIGRAM(S): 25 TABLET, FILM COATED ORAL at 11:06

## 2022-01-04 RX ADMIN — INSULIN GLARGINE 40 UNIT(S): 100 INJECTION, SOLUTION SUBCUTANEOUS at 21:36

## 2022-01-04 RX ADMIN — MONTELUKAST 10 MILLIGRAM(S): 4 TABLET, CHEWABLE ORAL at 11:06

## 2022-01-04 RX ADMIN — ALBUTEROL 2 PUFF(S): 90 AEROSOL, METERED ORAL at 08:21

## 2022-01-04 RX ADMIN — ATORVASTATIN CALCIUM 80 MILLIGRAM(S): 80 TABLET, FILM COATED ORAL at 21:29

## 2022-01-04 RX ADMIN — DULOXETINE HYDROCHLORIDE 60 MILLIGRAM(S): 30 CAPSULE, DELAYED RELEASE ORAL at 11:06

## 2022-01-04 RX ADMIN — Medication 6: at 21:29

## 2022-01-04 RX ADMIN — Medication 10 UNIT(S): at 17:57

## 2022-01-04 RX ADMIN — RIVAROXABAN 20 MILLIGRAM(S): KIT at 17:57

## 2022-01-04 RX ADMIN — Medication 60 MILLIGRAM(S): at 11:06

## 2022-01-04 RX ADMIN — ALBUTEROL 2 PUFF(S): 90 AEROSOL, METERED ORAL at 17:49

## 2022-01-04 RX ADMIN — Medication 6 UNIT(S): at 08:01

## 2022-01-04 RX ADMIN — Medication 10 UNIT(S): at 12:42

## 2022-01-04 RX ADMIN — Medication 6: at 17:57

## 2022-01-04 RX ADMIN — ALBUTEROL 2 PUFF(S): 90 AEROSOL, METERED ORAL at 21:21

## 2022-01-04 RX ADMIN — Medication 50 MILLIGRAM(S): at 21:29

## 2022-01-04 RX ADMIN — Medication 50 MILLIGRAM(S): at 11:06

## 2022-01-04 RX ADMIN — BUDESONIDE AND FORMOTEROL FUMARATE DIHYDRATE 2 PUFF(S): 160; 4.5 AEROSOL RESPIRATORY (INHALATION) at 08:22

## 2022-01-04 RX ADMIN — BUDESONIDE AND FORMOTEROL FUMARATE DIHYDRATE 2 PUFF(S): 160; 4.5 AEROSOL RESPIRATORY (INHALATION) at 21:22

## 2022-01-04 NOTE — PROGRESS NOTE ADULT - SUBJECTIVE AND OBJECTIVE BOX
HPI:  81F w/PMH CAD/stent/CABG, afib on AC, [id number]- VASc #6, PPM, T2DM, asthma w/ Home O2 at night 2.5LNC, HFrEF, presents c/o worsening wheezing, cough and sob.  Pt endorses she was treated for asthma exac 3 weeks ago w/ doxycycline, prednisone taper and was improving until prednisone was down to 10mg and she seemed to have worsening symptoms again.  Pt is visiting up here from [place] and her medical team is down there. She denies any fever/chills, cp, palpitations, LE edema, n/v/d, abd pain, dysuria.  Vacc x3.    1/4/21- seen at bedside and examined, states she feels better, lung sounds with slight improvement, still + inspiratory wheeze RUL, scattered coarse rhonchi, no cp, some RENDON    All other systems reviewed and found to be negative with the exception of what has been described above.      MEDICATIONS  (STANDING):  ALBUTerol    90 MICROgram(s) HFA Inhaler 2 Puff(s) Inhalation four times a day  atorvastatin 80 milliGRAM(s) Oral at bedtime  budesonide 160 MICROgram(s)/formoterol 4.5 MICROgram(s) Inhaler 2 Puff(s) Inhalation two times a day  dextrose 40% Gel 15 Gram(s) Oral once  dextrose 5%. 1000 milliLiter(s) (50 mL/Hr) IV Continuous <Continuous>  dextrose 5%. 1000 milliLiter(s) (100 mL/Hr) IV Continuous <Continuous>  dextrose 50% Injectable 25 Gram(s) IV Push once  dextrose 50% Injectable 12.5 Gram(s) IV Push once  dextrose 50% Injectable 25 Gram(s) IV Push once  DULoxetine 60 milliGRAM(s) Oral daily  glucagon  Injectable 1 milliGRAM(s) IntraMuscular once  glucagon  Injectable 1 milliGRAM(s) IntraMuscular once  insulin glargine Injectable (LANTUS) 40 Unit(s) SubCutaneous at bedtime  insulin lispro (ADMELOG) corrective regimen sliding scale   SubCutaneous three times a day before meals  insulin lispro (ADMELOG) corrective regimen sliding scale   SubCutaneous at bedtime  insulin lispro Injectable (ADMELOG) 10 Unit(s) SubCutaneous three times a day before meals  methylPREDNISolone sodium succinate Injectable 60 milliGRAM(s) IV Push two times a day  metoprolol succinate ER 50 milliGRAM(s) Oral two times a day  montelukast 10 milliGRAM(s) Oral daily  rivaroxaban 20 milliGRAM(s) Oral with dinner  spironolactone 25 milliGRAM(s) Oral daily  torsemide 40 milliGRAM(s) Oral daily    MEDICATIONS  (PRN):  guaiFENesin ER 1200 milliGRAM(s) Oral every 12 hours PRN Cough  ondansetron Injectable 4 milliGRAM(s) IV Push every 6 hours PRN Nausea      Vital Signs Last 24 Hrs  T(C): 36.7 (01-04-22 @ 08:45), Max: 37 (01-04-22 @ 00:34)  T(F): 98.1 (01-04-22 @ 08:45), Max: 98.6 (01-04-22 @ 00:34)  HR: 72 (01-04-22 @ 08:45) (70 - 80)  BP: 153/73 (01-04-22 @ 08:45) (115/55 - 153/73)  BP(mean): 82 (01-04-22 @ 00:34) (82 - 82)  RR: 16 (01-04-22 @ 08:45) (16 - 18)  SpO2: 100% (01-04-22 @ 08:45) (95% - 100%)          PHYSICAL EXAM:    GENERAL: Comfortable, no acute distress   HEAD:  Normocephalic, atraumatic  EYES: EOMI, PERRLA  HEENT: Moist mucous membranes  NECK: Supple, No JVD  NERVOUS SYSTEM:  Alert & Oriented X3, Motor Strength 5/5 B/L upper and lower extremities  CHEST/LUNG: + insp wheeze RUL, scattered coarse rhonchi throughout,  HEART: Regular rate and rhythm  ABDOMEN: Soft, non tender, Nondistended, Bowel sounds present  GENITOURINARY: Voiding, no palpable bladder  EXTREMITIES:   No clubbing, cyanosis, or edema  MUSCULOSKELETAL- No muscle tenderness, no joint tenderness  SKIN-no rash        LABS:  no new labs today      < from: Xray Chest 1 View- PORTABLE-Urgent (12.29.21 @ 03:28) >    ACC: [id number] EXAM:  XR CHEST PORTABLE URGENT 1V                          PROCEDURE DATE:  12/29/2021          INTERPRETATION:  Chest one view    HISTORY: Chest pain    COMPARISON STUDY: None available    Frontal expiratory view of the chest shows the heart to be borderline in   size. Sternal wires and left cardiac pacemaker are present.    The lungs are clear and there is no evidence of pneumothorax nor pleural   effusion.    IMPRESSION:  No active pulmonary disease.    IMP: 81 year-old woman with above pmh a/w:.     #Acute exacerbation of asthma  -pulm input appreciated-  - Continue IC/LABA/NABILA, steroids    #Chronic systolic CHF  -cont PO torsemide and spironolactone   - Continue Toprol XL  - Unclear why patient is not on ACE or ARB or Entresto, will discuss with patient, likely defer to her outside cardiologist    #Chronic afib, CV#6, stable, rate WNL  - Continue Toprol, xarelto    #Type II DM  A1c 9.0.  - Continue Lantus, premeal, bgm/ss , up titrating both prn    #Mild hyponatremia, improving  mike check bmp in am    #DVT px: On Xarelto for afib  #Code status: Full  #Dispo: home when clinically better

## 2022-01-04 NOTE — PROGRESS NOTE ADULT - ASSESSMENT
Assessment:   81 year-old woman with a history of CAD s/p PCI, CABG 2018, chronic systolic CHF, afib on Xarelto, s/p PPM, asthma, chronic respiratory failure on home O2 2L/min presented with wheezing and cough.   --Managed for asthma exacerbation and acute on chronic resp. failure  --She is gradually improving  --CXR shows no pulmonary infiltrates.     Recommendations:   --cont symbicort/albuterol/singulair  --on systemic steroids 60 BID   --dvt proph- on xarelto

## 2022-01-04 NOTE — PROGRESS NOTE ADULT - SUBJECTIVE AND OBJECTIVE BOX
SUBJECTIVE:    Seen and examined today.  Feeling better since admission.   Shortness of breath improved.  She still has cough and wheezing.   No fevers.  Eating well.  Ambulating to the bathroom.   Remains on NC.    1/4:  awake, alert, no distress.  she still has cough and wheezing.  is gradually improving.    ROS otherwise negative.       Vital Signs Last 24 Hrs  T(C): 36.4 (03 Jan 2022 08:56), Max: 36.6 (02 Jan 2022 20:32)  T(F): 97.6 (03 Jan 2022 08:56), Max: 97.9 (03 Jan 2022 00:41)  HR: 70 (03 Jan 2022 08:56) (70 - 84)  BP: 131/68 (03 Jan 2022 08:56) (125/70 - 131/68)  BP(mean): 85 (03 Jan 2022 08:56) (85 - 85)  RR: 16 (03 Jan 2022 08:56) (16 - 18)  SpO2: 95% (03 Jan 2022 08:56) (93% - 95%)    PHYSICAL EXAMINATION:    GENERAL APPEARANCE:  No distress. Sitting upright in bed.   HEAD: Normocephalic atraumatic   HEENT:  Mucus membranes moist. Oropharynx normal.   NECK:  Supple.  No stridor.  HEART:  Normal S1 and S2. There are no murmurs  CHEST:  Bilateral low pitched wheezes   ABDOMEN:  Soft and nontender. Nondistended.   SKIN:  No rash or significant lesions are noted. No jaundice.  PSYCH: Normal affect.  NEURO: Alert and oriented. Responds to question appropriate. No focal deficits appreciated.       MEDICATIONS  (STANDING):  ALBUTerol    90 MICROgram(s) HFA Inhaler 2 Puff(s) Inhalation four times a day  atorvastatin 80 milliGRAM(s) Oral at bedtime  budesonide 160 MICROgram(s)/formoterol 4.5 MICROgram(s) Inhaler 2 Puff(s) Inhalation two times a day  dextrose 40% Gel 15 Gram(s) Oral once  dextrose 5%. 1000 milliLiter(s) (50 mL/Hr) IV Continuous <Continuous>  dextrose 5%. 1000 milliLiter(s) (100 mL/Hr) IV Continuous <Continuous>  dextrose 50% Injectable 25 Gram(s) IV Push once  dextrose 50% Injectable 12.5 Gram(s) IV Push once  dextrose 50% Injectable 25 Gram(s) IV Push once  DULoxetine 60 milliGRAM(s) Oral daily  glucagon  Injectable 1 milliGRAM(s) IntraMuscular once  glucagon  Injectable 1 milliGRAM(s) IntraMuscular once  insulin glargine Injectable (LANTUS) 40 Unit(s) SubCutaneous at bedtime  insulin lispro (ADMELOG) corrective regimen sliding scale   SubCutaneous three times a day before meals  insulin lispro (ADMELOG) corrective regimen sliding scale   SubCutaneous at bedtime  insulin lispro Injectable (ADMELOG) 6 Unit(s) SubCutaneous three times a day before meals  methylPREDNISolone sodium succinate Injectable 60 milliGRAM(s) IV Push two times a day  metoprolol succinate ER 50 milliGRAM(s) Oral two times a day  montelukast 10 milliGRAM(s) Oral daily  rivaroxaban 20 milliGRAM(s) Oral with dinner  spironolactone 25 milliGRAM(s) Oral daily  torsemide 40 milliGRAM(s) Oral daily    MEDICATIONS  (PRN):  guaiFENesin ER 1200 milliGRAM(s) Oral every 12 hours PRN Cough  ondansetron Injectable 4 milliGRAM(s) IV Push every 6 hours PRN Nausea      Allergies    penicillin (Swelling)    Intolerances    PROCEDURE DATE: 12/29/2021        INTERPRETATION: Chest one view    HISTORY: Chest pain    COMPARISON STUDY: None available    Frontal expiratory view of the chest shows the heart to be borderline in size. Sternal wires and left cardiac pacemaker are present.    The lungs are clear and there is no evidence of pneumothorax nor pleural effusion.    IMPRESSION:  No active pulmonary disease.    Thank you for the courtesy of this referral.

## 2022-01-05 LAB
ANION GAP SERPL CALC-SCNC: 3 MMOL/L — LOW (ref 5–17)
BUN SERPL-MCNC: 43 MG/DL — HIGH (ref 7–23)
CALCIUM SERPL-MCNC: 10 MG/DL — SIGNIFICANT CHANGE UP (ref 8.5–10.1)
CHLORIDE SERPL-SCNC: 95 MMOL/L — LOW (ref 96–108)
CO2 SERPL-SCNC: 37 MMOL/L — HIGH (ref 22–31)
CREAT SERPL-MCNC: 1.05 MG/DL — SIGNIFICANT CHANGE UP (ref 0.5–1.3)
GLUCOSE SERPL-MCNC: 285 MG/DL — HIGH (ref 70–99)
HCT VFR BLD CALC: 51.7 % — HIGH (ref 34.5–45)
HGB BLD-MCNC: 16.9 G/DL — HIGH (ref 11.5–15.5)
MCHC RBC-ENTMCNC: 31.3 PG — SIGNIFICANT CHANGE UP (ref 27–34)
MCHC RBC-ENTMCNC: 32.7 GM/DL — SIGNIFICANT CHANGE UP (ref 32–36)
MCV RBC AUTO: 95.7 FL — SIGNIFICANT CHANGE UP (ref 80–100)
PLATELET # BLD AUTO: 150 K/UL — SIGNIFICANT CHANGE UP (ref 150–400)
POTASSIUM SERPL-MCNC: 4.6 MMOL/L — SIGNIFICANT CHANGE UP (ref 3.5–5.3)
POTASSIUM SERPL-SCNC: 4.6 MMOL/L — SIGNIFICANT CHANGE UP (ref 3.5–5.3)
RBC # BLD: 5.4 M/UL — HIGH (ref 3.8–5.2)
RBC # FLD: 14.2 % — SIGNIFICANT CHANGE UP (ref 10.3–14.5)
SODIUM SERPL-SCNC: 135 MMOL/L — SIGNIFICANT CHANGE UP (ref 135–145)
WBC # BLD: 8.93 K/UL — SIGNIFICANT CHANGE UP (ref 3.8–10.5)
WBC # FLD AUTO: 8.93 K/UL — SIGNIFICANT CHANGE UP (ref 3.8–10.5)

## 2022-01-05 PROCEDURE — 99232 SBSQ HOSP IP/OBS MODERATE 35: CPT

## 2022-01-05 PROCEDURE — 99233 SBSQ HOSP IP/OBS HIGH 50: CPT

## 2022-01-05 RX ORDER — INSULIN GLARGINE 100 [IU]/ML
45 INJECTION, SOLUTION SUBCUTANEOUS AT BEDTIME
Refills: 0 | Status: DISCONTINUED | OUTPATIENT
Start: 2022-01-05 | End: 2022-01-07

## 2022-01-05 RX ADMIN — DULOXETINE HYDROCHLORIDE 60 MILLIGRAM(S): 30 CAPSULE, DELAYED RELEASE ORAL at 09:17

## 2022-01-05 RX ADMIN — ATORVASTATIN CALCIUM 80 MILLIGRAM(S): 80 TABLET, FILM COATED ORAL at 21:39

## 2022-01-05 RX ADMIN — BUDESONIDE AND FORMOTEROL FUMARATE DIHYDRATE 2 PUFF(S): 160; 4.5 AEROSOL RESPIRATORY (INHALATION) at 19:43

## 2022-01-05 RX ADMIN — Medication 40 MILLIGRAM(S): at 09:17

## 2022-01-05 RX ADMIN — Medication 4: at 17:48

## 2022-01-05 RX ADMIN — Medication 8: at 13:40

## 2022-01-05 RX ADMIN — RIVAROXABAN 20 MILLIGRAM(S): KIT at 17:48

## 2022-01-05 RX ADMIN — Medication 40 MILLIGRAM(S): at 21:39

## 2022-01-05 RX ADMIN — Medication 10 UNIT(S): at 17:47

## 2022-01-05 RX ADMIN — Medication 50 MILLIGRAM(S): at 09:17

## 2022-01-05 RX ADMIN — ALBUTEROL 2 PUFF(S): 90 AEROSOL, METERED ORAL at 19:43

## 2022-01-05 RX ADMIN — Medication 10 UNIT(S): at 13:40

## 2022-01-05 RX ADMIN — Medication 1200 MILLIGRAM(S): at 21:52

## 2022-01-05 RX ADMIN — ALBUTEROL 2 PUFF(S): 90 AEROSOL, METERED ORAL at 13:41

## 2022-01-05 RX ADMIN — INSULIN GLARGINE 45 UNIT(S): 100 INJECTION, SOLUTION SUBCUTANEOUS at 21:39

## 2022-01-05 RX ADMIN — Medication 60 MILLIGRAM(S): at 09:21

## 2022-01-05 RX ADMIN — BUDESONIDE AND FORMOTEROL FUMARATE DIHYDRATE 2 PUFF(S): 160; 4.5 AEROSOL RESPIRATORY (INHALATION) at 10:41

## 2022-01-05 RX ADMIN — ALBUTEROL 2 PUFF(S): 90 AEROSOL, METERED ORAL at 17:41

## 2022-01-05 RX ADMIN — Medication 4: at 21:39

## 2022-01-05 RX ADMIN — MONTELUKAST 10 MILLIGRAM(S): 4 TABLET, CHEWABLE ORAL at 09:17

## 2022-01-05 RX ADMIN — SPIRONOLACTONE 25 MILLIGRAM(S): 25 TABLET, FILM COATED ORAL at 09:21

## 2022-01-05 RX ADMIN — Medication 50 MILLIGRAM(S): at 21:39

## 2022-01-05 RX ADMIN — Medication 6: at 08:58

## 2022-01-05 RX ADMIN — Medication 10 UNIT(S): at 08:58

## 2022-01-05 NOTE — PROGRESS NOTE ADULT - ASSESSMENT
Assessment:   81 year-old woman with a history of CAD s/p PCI, CABG 2018, chronic systolic CHF, afib on Xarelto, s/p PPM, asthma, chronic respiratory failure on home O2 2L/min presented with wheezing and cough.   --Managed for asthma exacerbation and acute on chronic resp. failure  --She is gradually improving  --CXR shows no pulmonary infiltrates.     Recommendations:   --cont symbicort/albuterol/singulair  --on systemic steroids, will decrease solumedrol to 40 mg IV BID   --dvt proph- on xarelto

## 2022-01-05 NOTE — PROGRESS NOTE ADULT - SUBJECTIVE AND OBJECTIVE BOX
HPI:  81F w/PMH CAD/stent/CABG, afib on AC, COM1PR6- VASc #6, PPM, T2DM, asthma w/ Home O2 at night 2.5LNC, HFrEF, presents c/o worsening wheezing, cough and sob.  Pt endorses she was treated for asthma exac 3 weeks ago w/ doxycycline, prednisone taper and was improving until prednisone was down to 10mg and she seemed to have worsening symptoms again.  Pt is visiting up here from [place] and her medical team is down there. She denies any fever/chills, cp, palpitations, LE edema, n/v/d, abd pain, dysuria.  Vacc x3.    1/5/21- seen at bedside and examined, states she feels better, lung sounds improving, no wheezes noted, less rhonchi, no cp, some RENDON    All other systems reviewed and found to be negative with the exception of what has been described above.    MEDICATIONS  (STANDING):  ALBUTerol    90 MICROgram(s) HFA Inhaler 2 Puff(s) Inhalation four times a day  atorvastatin 80 milliGRAM(s) Oral at bedtime  budesonide 160 MICROgram(s)/formoterol 4.5 MICROgram(s) Inhaler 2 Puff(s) Inhalation two times a day  dextrose 40% Gel 15 Gram(s) Oral once  dextrose 5%. 1000 milliLiter(s) (50 mL/Hr) IV Continuous <Continuous>  dextrose 5%. 1000 milliLiter(s) (100 mL/Hr) IV Continuous <Continuous>  dextrose 50% Injectable 25 Gram(s) IV Push once  dextrose 50% Injectable 12.5 Gram(s) IV Push once  dextrose 50% Injectable 25 Gram(s) IV Push once  DULoxetine 60 milliGRAM(s) Oral daily  glucagon  Injectable 1 milliGRAM(s) IntraMuscular once  glucagon  Injectable 1 milliGRAM(s) IntraMuscular once  insulin glargine Injectable (LANTUS) 45 Unit(s) SubCutaneous at bedtime  insulin lispro (ADMELOG) corrective regimen sliding scale   SubCutaneous three times a day before meals  insulin lispro (ADMELOG) corrective regimen sliding scale   SubCutaneous at bedtime  insulin lispro Injectable (ADMELOG) 10 Unit(s) SubCutaneous three times a day before meals  methylPREDNISolone sodium succinate Injectable 40 milliGRAM(s) IV Push two times a day  metoprolol succinate ER 50 milliGRAM(s) Oral two times a day  montelukast 10 milliGRAM(s) Oral daily  rivaroxaban 20 milliGRAM(s) Oral with dinner  spironolactone 25 milliGRAM(s) Oral daily  torsemide 40 milliGRAM(s) Oral daily    MEDICATIONS  (PRN):  guaiFENesin ER 1200 milliGRAM(s) Oral every 12 hours PRN Cough  ondansetron Injectable 4 milliGRAM(s) IV Push every 6 hours PRN Nausea    Vital Signs Last 24 Hrs  T(C): 36.7 (01-05-22 @ 09:06), Max: 36.7 (01-04-22 @ 21:00)  T(F): 98.1 (01-05-22 @ 09:06), Max: 98.1 (01-04-22 @ 21:00)  HR: 84 (01-05-22 @ 09:06) (72 - 84)  BP: 144/71 (01-05-22 @ 09:06) (123/61 - 144/71)  BP(mean): --  RR: 18 (01-05-22 @ 09:06) (16 - 18)  SpO2: 96% (01-05-22 @ 09:06) (92% - 96%)        PHYSICAL EXAM:    GENERAL: Comfortable, no acute distress   HEAD:  Normocephalic, atraumatic  EYES: EOMI, PERRLA  HEENT: Moist mucous membranes  NECK: Supple, No JVD  NERVOUS SYSTEM:  Alert & Oriented X3, Motor Strength 5/5 B/L upper and lower extremities  CHEST/LUNG: no wheezes, rales noted, diminished at BLB, minimal rhonchi  HEART: Regular rate and rhythm  ABDOMEN: Soft, non tender, Nondistended, Bowel sounds present  GENITOURINARY: Voiding, no palpable bladder  EXTREMITIES:   No clubbing, cyanosis, or edema  MUSCULOSKELETAL- No muscle tenderness, no joint tenderness  SKIN-no rash        LABS:                        16.9   8.93  )-----------( 150      ( 05 Jan 2022 08:06 )             51.7       01-05    135  |  95<L>  |  43<H>  ----------------------------<  285<H>  4.6   |  37<H>  |  1.05    Ca    10.0      05 Jan 2022 08:06              < from: Xray Chest 1 View- PORTABLE-Urgent (12.29.21 @ 03:28) >    ACC: [id number] EXAM:  XR CHEST PORTABLE URGENT 1V                          PROCEDURE DATE:  12/29/2021          INTERPRETATION:  Chest one view    HISTORY: Chest pain    COMPARISON STUDY: None available    Frontal expiratory view of the chest shows the heart to be borderline in   size. Sternal wires and left cardiac pacemaker are present.    The lungs are clear and there is no evidence of pneumothorax nor pleural   effusion.    IMPRESSION:  No active pulmonary disease.    IMP: 81 year-old woman with above pmh a/w:.     #Acute exacerbation of asthma  -pulm input appreciated-  - Continue IC/LABA/NABILA, steroids    #Chronic systolic CHF  -cont PO torsemide and spironolactone   - Continue Toprol XL  - Unclear why patient is not on ACE or ARB or Entresto, will discuss with patient, likely defer to her outside cardiologist    #polycythemia   noted  monitor hh    #Chronic afib, CV#6, stable, rate WNL  - Continue Toprol, xarelto    #Type II DM  A1c 9.0.  - Continue, premeal, bgm/ss , up titrating both prn  - lantus up titrated further    #Mild hyponatremia, improving  resolved    #DVT px: On Xarelto for afib  #Code status: Full  #Dispo: home when clinically better

## 2022-01-05 NOTE — PROGRESS NOTE ADULT - SUBJECTIVE AND OBJECTIVE BOX
SUBJECTIVE:    Seen and examined today.  Feeling better since admission.   Shortness of breath improved.  She still has cough and wheezing.   No fevers.  Eating well.  Ambulating to the bathroom.   Remains on NC.    1/4:  awake, alert, no distress.  she still has cough and wheezing.  is gradually improving.  1/5:  awake, alert, no distress.      ROS otherwise negative.       Vital Signs Last 24 Hrs  T(C): 36.4 (03 Jan 2022 08:56), Max: 36.6 (02 Jan 2022 20:32)  T(F): 97.6 (03 Jan 2022 08:56), Max: 97.9 (03 Jan 2022 00:41)  HR: 70 (03 Jan 2022 08:56) (70 - 84)  BP: 131/68 (03 Jan 2022 08:56) (125/70 - 131/68)  BP(mean): 85 (03 Jan 2022 08:56) (85 - 85)  RR: 16 (03 Jan 2022 08:56) (16 - 18)  SpO2: 95% (03 Jan 2022 08:56) (93% - 95%)    PHYSICAL EXAMINATION:    GENERAL APPEARANCE:  No distress. Sitting upright in bed.   HEAD: Normocephalic atraumatic   HEENT:  Mucus membranes moist. Oropharynx normal.   NECK:  Supple.  No stridor.  HEART:  Normal S1 and S2. There are no murmurs  CHEST:  Some decrease in bilat inspiratory wheezes, rhonchi.   ABDOMEN:  Soft and nontender. Nondistended.   SKIN:  No rash or significant lesions are noted. No jaundice.  PSYCH: Normal affect.  NEURO: Alert and oriented. Responds to question appropriate. No focal deficits appreciated.       MEDICATIONS  (STANDING):  ALBUTerol    90 MICROgram(s) HFA Inhaler 2 Puff(s) Inhalation four times a day  atorvastatin 80 milliGRAM(s) Oral at bedtime  budesonide 160 MICROgram(s)/formoterol 4.5 MICROgram(s) Inhaler 2 Puff(s) Inhalation two times a day  dextrose 40% Gel 15 Gram(s) Oral once  dextrose 5%. 1000 milliLiter(s) (50 mL/Hr) IV Continuous <Continuous>  dextrose 5%. 1000 milliLiter(s) (100 mL/Hr) IV Continuous <Continuous>  dextrose 50% Injectable 25 Gram(s) IV Push once  dextrose 50% Injectable 12.5 Gram(s) IV Push once  dextrose 50% Injectable 25 Gram(s) IV Push once  DULoxetine 60 milliGRAM(s) Oral daily  glucagon  Injectable 1 milliGRAM(s) IntraMuscular once  glucagon  Injectable 1 milliGRAM(s) IntraMuscular once  insulin glargine Injectable (LANTUS) 40 Unit(s) SubCutaneous at bedtime  insulin lispro (ADMELOG) corrective regimen sliding scale   SubCutaneous three times a day before meals  insulin lispro (ADMELOG) corrective regimen sliding scale   SubCutaneous at bedtime  insulin lispro Injectable (ADMELOG) 6 Unit(s) SubCutaneous three times a day before meals  methylPREDNISolone sodium succinate Injectable 60 milliGRAM(s) IV Push two times a day  metoprolol succinate ER 50 milliGRAM(s) Oral two times a day  montelukast 10 milliGRAM(s) Oral daily  rivaroxaban 20 milliGRAM(s) Oral with dinner  spironolactone 25 milliGRAM(s) Oral daily  torsemide 40 milliGRAM(s) Oral daily    MEDICATIONS  (PRN):  guaiFENesin ER 1200 milliGRAM(s) Oral every 12 hours PRN Cough  ondansetron Injectable 4 milliGRAM(s) IV Push every 6 hours PRN Nausea      Allergies    penicillin (Swelling)    Intolerances    PROCEDURE DATE: 12/29/2021        INTERPRETATION: Chest one view    HISTORY: Chest pain    COMPARISON STUDY: None available    Frontal expiratory view of the chest shows the heart to be borderline in size. Sternal wires and left cardiac pacemaker are present.    The lungs are clear and there is no evidence of pneumothorax nor pleural effusion.    IMPRESSION:  No active pulmonary disease.    Thank you for the courtesy of this referral.

## 2022-01-06 PROCEDURE — 99291 CRITICAL CARE FIRST HOUR: CPT

## 2022-01-06 PROCEDURE — 99232 SBSQ HOSP IP/OBS MODERATE 35: CPT

## 2022-01-06 RX ADMIN — Medication 40 MILLIGRAM(S): at 21:26

## 2022-01-06 RX ADMIN — ALBUTEROL 2 PUFF(S): 90 AEROSOL, METERED ORAL at 14:59

## 2022-01-06 RX ADMIN — DULOXETINE HYDROCHLORIDE 60 MILLIGRAM(S): 30 CAPSULE, DELAYED RELEASE ORAL at 10:25

## 2022-01-06 RX ADMIN — MONTELUKAST 10 MILLIGRAM(S): 4 TABLET, CHEWABLE ORAL at 10:25

## 2022-01-06 RX ADMIN — Medication 4: at 16:46

## 2022-01-06 RX ADMIN — RIVAROXABAN 20 MILLIGRAM(S): KIT at 17:43

## 2022-01-06 RX ADMIN — Medication 40 MILLIGRAM(S): at 10:24

## 2022-01-06 RX ADMIN — Medication 50 MILLIGRAM(S): at 21:26

## 2022-01-06 RX ADMIN — ALBUTEROL 2 PUFF(S): 90 AEROSOL, METERED ORAL at 20:44

## 2022-01-06 RX ADMIN — Medication 10 UNIT(S): at 07:32

## 2022-01-06 RX ADMIN — INSULIN GLARGINE 45 UNIT(S): 100 INJECTION, SOLUTION SUBCUTANEOUS at 21:26

## 2022-01-06 RX ADMIN — BUDESONIDE AND FORMOTEROL FUMARATE DIHYDRATE 2 PUFF(S): 160; 4.5 AEROSOL RESPIRATORY (INHALATION) at 07:56

## 2022-01-06 RX ADMIN — Medication 40 MILLIGRAM(S): at 10:42

## 2022-01-06 RX ADMIN — Medication 2: at 07:32

## 2022-01-06 RX ADMIN — BUDESONIDE AND FORMOTEROL FUMARATE DIHYDRATE 2 PUFF(S): 160; 4.5 AEROSOL RESPIRATORY (INHALATION) at 20:45

## 2022-01-06 RX ADMIN — ALBUTEROL 2 PUFF(S): 90 AEROSOL, METERED ORAL at 07:56

## 2022-01-06 RX ADMIN — Medication 10 UNIT(S): at 16:46

## 2022-01-06 RX ADMIN — Medication 50 MILLIGRAM(S): at 10:25

## 2022-01-06 RX ADMIN — ATORVASTATIN CALCIUM 80 MILLIGRAM(S): 80 TABLET, FILM COATED ORAL at 21:26

## 2022-01-06 RX ADMIN — Medication 10 UNIT(S): at 11:34

## 2022-01-06 RX ADMIN — Medication 4: at 11:34

## 2022-01-06 RX ADMIN — SPIRONOLACTONE 25 MILLIGRAM(S): 25 TABLET, FILM COATED ORAL at 10:24

## 2022-01-06 NOTE — PROGRESS NOTE ADULT - SUBJECTIVE AND OBJECTIVE BOX
HPI:  81F w/PMH CAD/stent/CABG, afib on AC, [id number]- VASc #6, PPM, T2DM, asthma w/ Home O2 at night 2.5LNC, HFrEF, presents c/o worsening wheezing, cough and sob.  Pt endorses she was treated for asthma exac 3 weeks ago w/ doxycycline, prednisone taper and was improving until prednisone was down to 10mg and she seemed to have worsening symptoms again.  Pt is visiting up here from [place] and her medical team is down there. She denies any fever/chills, cp, palpitations, LE edema, n/v/d, abd pain, dysuria.  Vacc x3.    1/6/22- seen oob to chair and amb in her, states she feels better, lung sounds improving, no wheezes/or rhonchi noted, no cp, some RENDON    All other systems reviewed and found to be negative with the exception of what has been described above.      Vital Signs Last 24 Hrs  T(C): 36.6 (01-06-22 @ 08:25), Max: 36.6 (01-05-22 @ 20:13)  T(F): 97.8 (01-06-22 @ 08:25), Max: 97.9 (01-05-22 @ 20:13)  HR: 73 (01-06-22 @ 08:25) (70 - 76)  BP: 124/71 (01-06-22 @ 08:25) (119/63 - 124/71)  BP(mean): --  RR: 16 (01-06-22 @ 08:25) (16 - 17)  SpO2: 95% (01-06-22 @ 08:25) (95% - 96%)    PHYSICAL EXAM:    GENERAL: Comfortable, no acute distress   HEAD:  Normocephalic, atraumatic  EYES: EOMI, PERRLA  HEENT: Moist mucous membranes  NECK: Supple, No JVD  NERVOUS SYSTEM:  Alert & Oriented X3, Motor Strength 5/5 B/L upper and lower extremities  CHEST/LUNG: no wheezes/rales or rhonchi, CTA, + non prod cough  HEART: Regular rate and rhythm  ABDOMEN: Soft, non tender, Nondistended, Bowel sounds present  GENITOURINARY: Voiding, no palpable bladder  EXTREMITIES:   No clubbing, cyanosis, or edema  MUSCULOSKELETAL- No muscle tenderness, no joint tenderness  SKIN-no rash        LABS:           no new labs today                           < from: Xray Chest 1 View- PORTABLE-Urgent (12.29.21 @ 03:28) >    ACC: [id number] EXAM:  XR CHEST PORTABLE URGENT 1V                          PROCEDURE DATE:  12/29/2021          INTERPRETATION:  Chest one view    HISTORY: Chest pain    COMPARISON STUDY: None available    Frontal expiratory view of the chest shows the heart to be borderline in   size. Sternal wires and left cardiac pacemaker are present.    The lungs are clear and there is no evidence of pneumothorax nor pleural   effusion.    IMPRESSION:  No active pulmonary disease.    IMP: 81 year-old woman with above pmh a/w:.     #Acute exacerbation of asthma  -pulm input appreciated-  - Continue IC/LABA/NABILA,   - iv steroids after tonights dose  - start po prednisone 60 mg daily x 1 week then taper    #Chronic systolic CHF  -cont PO torsemide and spironolactone   - Continue Toprol XL  - pt to f/u with her cardio as out pt in regards to recommended use of entresto, ace/arb     #polycythemia   noted  monitor hh    #Chronic afib, CV#6, stable, rate WNL  - Continue Toprol, xarelto    #Type II DM  A1c 9.0.  - Continue, premeal, bgm/ss , up titrating both prn  -inc lantus to 45 units qhs    #Mild hyponatremia, improving  resolved    #DVT px: On Xarelto for afib  #Code status: Full  #Dispo: home likely tomorrow   HPI:  81F w/PMH CAD/stent/CABG, afib on AC, [id number]- VASc #6, PPM, T2DM, asthma w/ Home O2 at night 2.5LNC, HFrEF, presents c/o worsening wheezing, cough and sob.  Pt endorses she was treated for asthma exac 3 weeks ago w/ doxycycline, prednisone taper and was improving until prednisone was down to 10mg and she seemed to have worsening symptoms again.  Pt is visiting up here from [place] and her medical team is down there. She denies any fever/chills, cp, palpitations, LE edema, n/v/d, abd pain, dysuria.  Vacc x3.    1/6/22- seen oob to chair and amb in her, states she feels better, lung sounds improving, no wheezes/or rhonchi noted, no cp, some RENDON    All other systems reviewed and found to be negative with the exception of what has been described above.      Vital Signs Last 24 Hrs  T(C): 36.6 (01-06-22 @ 08:25), Max: 36.6 (01-05-22 @ 20:13)  T(F): 97.8 (01-06-22 @ 08:25), Max: 97.9 (01-05-22 @ 20:13)  HR: 73 (01-06-22 @ 08:25) (70 - 76)  BP: 124/71 (01-06-22 @ 08:25) (119/63 - 124/71)  BP(mean): --  RR: 16 (01-06-22 @ 08:25) (16 - 17)  SpO2: 95% (01-06-22 @ 08:25) (95% - 96%)    PHYSICAL EXAM:    GENERAL: Comfortable, no acute distress   HEAD:  Normocephalic, atraumatic  EYES: EOMI, PERRLA  HEENT: Moist mucous membranes  NECK: Supple, No JVD  NERVOUS SYSTEM:  Alert & Oriented X3, Motor Strength 5/5 B/L upper and lower extremities  CHEST/LUNG: no wheezes/rales or rhonchi, CTA, + non prod cough  HEART: Regular rate and rhythm  ABDOMEN: Soft, non tender, Nondistended, Bowel sounds present  GENITOURINARY: Voiding, no palpable bladder  EXTREMITIES:   No clubbing, cyanosis, or edema  MUSCULOSKELETAL- No muscle tenderness, no joint tenderness  SKIN-no rash        LABS:           no new labs today                           < from: Xray Chest 1 View- PORTABLE-Urgent (12.29.21 @ 03:28) >    ACC: [id number] EXAM:  XR CHEST PORTABLE URGENT 1V                          PROCEDURE DATE:  12/29/2021          INTERPRETATION:  Chest one view    HISTORY: Chest pain    COMPARISON STUDY: None available    Frontal expiratory view of the chest shows the heart to be borderline in   size. Sternal wires and left cardiac pacemaker are present.    The lungs are clear and there is no evidence of pneumothorax nor pleural   effusion.    IMPRESSION:  No active pulmonary disease.    IMP: 81 year-old woman with above pmh a/w:.     #Acute exacerbation of asthma  -pulm input appreciated-  - Continue IC/LABA/NABILA,   - iv steroids after tonights dose  - start po prednisone 60 mg daily x 1 week then taper    #Chronic systolic CHF  -cont PO torsemide and spironolactone   - Continue Toprol XL  - pt to f/u with her cardio as out pt in regards to recommended use of entresto, ace/arb     #polycythemia   noted  monitor hh    #Chronic afib, CV#6, stable, rate WNL  - Continue Toprol, xarelto    #Type II DM  A1c 9.0.  - Continue, premeal, bgm/ss,   -inc lantus to 45 units qhs  meds to be adjusted as she is being tapered off steroids    #Mild hyponatremia, improving  resolved    #DVT px: On Xarelto for afib  #Code status: Full  #Dispo: home likely tomorrow

## 2022-01-06 NOTE — PROGRESS NOTE ADULT - ASSESSMENT
Assessment:   81 year-old woman with a history of CAD s/p PCI, CABG 2018, chronic systolic CHF, afib on Xarelto, s/p PPM, asthma, chronic respiratory failure on home O2 2L/min presented with wheezing and cough.   --Managed for asthma exacerbation and acute on chronic resp. failure  --She is gradually improving - no wheezing on exam today.   --CXR shows no pulmonary infiltrates.     Recommendations:   --cont symbicort/albuterol/singulair  --on systemic steroids, Change to Prednisone PO(540mg to 60mg) for 5 more days   --dvt proph- on xarelto  --discharge planning

## 2022-01-06 NOTE — PROGRESS NOTE ADULT - SUBJECTIVE AND OBJECTIVE BOX
SUBJECTIVE:  Seen and examined today.  Feeling better since admission.   Shortness of breath improved.  Wheezing improved.   No fevers.  Eating well.  Ambulating well.     ROS otherwise negative.   PHYSICAL EXAMINATION:    GENERAL APPEARANCE:  No distress. Sitting upright in bed.   HEAD: Normocephalic atraumatic   HEENT:  Mucus membranes moist. Oropharynx normal.   NECK:  Supple.  No stridor.  HEART:  Normal S1 and S2. There are no murmurs  CHEST:  Some decrease in bilat inspiratory wheezes, rhonchi.   ABDOMEN:  Soft and nontender. Nondistended.   SKIN:  No rash or significant lesions are noted. No jaundice.  PSYCH: Normal affect.  NEURO: Alert and oriented. Responds to question appropriate. No focal deficits appreciated.       Vital Signs Last 24 Hrs  T(C): 36.6 (06 Jan 2022 08:25), Max: 36.6 (05 Jan 2022 20:13)  T(F): 97.8 (06 Jan 2022 08:25), Max: 97.9 (05 Jan 2022 20:13)  HR: 73 (06 Jan 2022 08:25) (70 - 76)  BP: 124/71 (06 Jan 2022 08:25) (119/63 - 124/71)  BP(mean): --  RR: 16 (06 Jan 2022 08:25) (16 - 17)  SpO2: 95% (06 Jan 2022 08:25) (95% - 96%)    MEDICATIONS  (STANDING):  ALBUTerol    90 MICROgram(s) HFA Inhaler 2 Puff(s) Inhalation four times a day  atorvastatin 80 milliGRAM(s) Oral at bedtime  budesonide 160 MICROgram(s)/formoterol 4.5 MICROgram(s) Inhaler 2 Puff(s) Inhalation two times a day  dextrose 40% Gel 15 Gram(s) Oral once  dextrose 5%. 1000 milliLiter(s) (50 mL/Hr) IV Continuous <Continuous>  dextrose 5%. 1000 milliLiter(s) (100 mL/Hr) IV Continuous <Continuous>  dextrose 50% Injectable 25 Gram(s) IV Push once  dextrose 50% Injectable 12.5 Gram(s) IV Push once  dextrose 50% Injectable 25 Gram(s) IV Push once  DULoxetine 60 milliGRAM(s) Oral daily  glucagon  Injectable 1 milliGRAM(s) IntraMuscular once  glucagon  Injectable 1 milliGRAM(s) IntraMuscular once  insulin glargine Injectable (LANTUS) 45 Unit(s) SubCutaneous at bedtime  insulin lispro (ADMELOG) corrective regimen sliding scale   SubCutaneous three times a day before meals  insulin lispro (ADMELOG) corrective regimen sliding scale   SubCutaneous at bedtime  insulin lispro Injectable (ADMELOG) 10 Unit(s) SubCutaneous three times a day before meals  methylPREDNISolone sodium succinate Injectable 40 milliGRAM(s) IV Push two times a day  metoprolol succinate ER 50 milliGRAM(s) Oral two times a day  montelukast 10 milliGRAM(s) Oral daily  rivaroxaban 20 milliGRAM(s) Oral with dinner  spironolactone 25 milliGRAM(s) Oral daily  torsemide 40 milliGRAM(s) Oral daily    MEDICATIONS  (PRN):  guaiFENesin ER 1200 milliGRAM(s) Oral every 12 hours PRN Cough  ondansetron Injectable 4 milliGRAM(s) IV Push every 6 hours PRN Nausea      Allergies    penicillin (Swelling)    Intolerances          LABS:                        16.9   8.93  )-----------( 150      ( 05 Jan 2022 08:06 )             51.7     01-05    135  |  95<L>  |  43<H>  ----------------------------<  285<H>  4.6   |  37<H>  |  1.05    Ca    10.0      05 Jan 2022 08:06          RADIOLOGY & ADDITIONAL STUDIES:   *Imaging personally reviewed.

## 2022-01-07 ENCOUNTER — TRANSCRIPTION ENCOUNTER (OUTPATIENT)
Age: 82
End: 2022-01-07

## 2022-01-07 VITALS
OXYGEN SATURATION: 95 % | SYSTOLIC BLOOD PRESSURE: 122 MMHG | TEMPERATURE: 98 F | DIASTOLIC BLOOD PRESSURE: 54 MMHG | RESPIRATION RATE: 16 BRPM | HEART RATE: 70 BPM

## 2022-01-07 LAB
ANION GAP SERPL CALC-SCNC: 2 MMOL/L — LOW (ref 5–17)
BUN SERPL-MCNC: 43 MG/DL — HIGH (ref 7–23)
CALCIUM SERPL-MCNC: 10.3 MG/DL — HIGH (ref 8.5–10.1)
CHLORIDE SERPL-SCNC: 93 MMOL/L — LOW (ref 96–108)
CO2 SERPL-SCNC: 35 MMOL/L — HIGH (ref 22–31)
CREAT SERPL-MCNC: 0.92 MG/DL — SIGNIFICANT CHANGE UP (ref 0.5–1.3)
GLUCOSE SERPL-MCNC: 284 MG/DL — HIGH (ref 70–99)
HCT VFR BLD CALC: 53.9 % — HIGH (ref 34.5–45)
HGB BLD-MCNC: 17.6 G/DL — HIGH (ref 11.5–15.5)
MCHC RBC-ENTMCNC: 31.3 PG — SIGNIFICANT CHANGE UP (ref 27–34)
MCHC RBC-ENTMCNC: 32.7 GM/DL — SIGNIFICANT CHANGE UP (ref 32–36)
MCV RBC AUTO: 95.9 FL — SIGNIFICANT CHANGE UP (ref 80–100)
PLATELET # BLD AUTO: 176 K/UL — SIGNIFICANT CHANGE UP (ref 150–400)
POTASSIUM SERPL-MCNC: 4.1 MMOL/L — SIGNIFICANT CHANGE UP (ref 3.5–5.3)
POTASSIUM SERPL-SCNC: 4.1 MMOL/L — SIGNIFICANT CHANGE UP (ref 3.5–5.3)
RBC # BLD: 5.62 M/UL — HIGH (ref 3.8–5.2)
RBC # FLD: 14 % — SIGNIFICANT CHANGE UP (ref 10.3–14.5)
SODIUM SERPL-SCNC: 130 MMOL/L — LOW (ref 135–145)
WBC # BLD: 12.32 K/UL — HIGH (ref 3.8–10.5)
WBC # FLD AUTO: 12.32 K/UL — HIGH (ref 3.8–10.5)

## 2022-01-07 PROCEDURE — 99233 SBSQ HOSP IP/OBS HIGH 50: CPT

## 2022-01-07 PROCEDURE — 99239 HOSP IP/OBS DSCHRG MGMT >30: CPT

## 2022-01-07 RX ADMIN — Medication 60 MILLIGRAM(S): at 09:52

## 2022-01-07 RX ADMIN — Medication 10 UNIT(S): at 11:25

## 2022-01-07 RX ADMIN — ALBUTEROL 2 PUFF(S): 90 AEROSOL, METERED ORAL at 09:37

## 2022-01-07 RX ADMIN — DULOXETINE HYDROCHLORIDE 60 MILLIGRAM(S): 30 CAPSULE, DELAYED RELEASE ORAL at 09:51

## 2022-01-07 RX ADMIN — Medication 10 UNIT(S): at 07:49

## 2022-01-07 RX ADMIN — BUDESONIDE AND FORMOTEROL FUMARATE DIHYDRATE 2 PUFF(S): 160; 4.5 AEROSOL RESPIRATORY (INHALATION) at 09:37

## 2022-01-07 RX ADMIN — Medication 50 MILLIGRAM(S): at 09:52

## 2022-01-07 RX ADMIN — Medication 40 MILLIGRAM(S): at 09:52

## 2022-01-07 RX ADMIN — RIVAROXABAN 20 MILLIGRAM(S): KIT at 15:59

## 2022-01-07 RX ADMIN — Medication 2: at 07:48

## 2022-01-07 RX ADMIN — Medication 6: at 11:24

## 2022-01-07 RX ADMIN — SPIRONOLACTONE 25 MILLIGRAM(S): 25 TABLET, FILM COATED ORAL at 12:19

## 2022-01-07 RX ADMIN — MONTELUKAST 10 MILLIGRAM(S): 4 TABLET, CHEWABLE ORAL at 09:52

## 2022-01-07 NOTE — DISCHARGE NOTE NURSING/CASE MANAGEMENT/SOCIAL WORK - INFLUENZA IMMUNIZATION DATE (APPROXIMATE):
What Is The Reason For Today's Visit?: Full Body Skin Examination What Is The Reason For Today's Visit? (Being Monitored For X): concerning skin lesions on an annual basis How Severe Are Your Spot(S)?: mild 01-Nov-2021

## 2022-01-07 NOTE — PROGRESS NOTE ADULT - PROVIDER SPECIALTY LIST ADULT
Hospitalist
Pulmonology
Hospitalist
Pulmonology

## 2022-01-07 NOTE — PROGRESS NOTE ADULT - ASSESSMENT
Assessment:   81 year-old woman with a history of CAD s/p PCI, CABG 2018, chronic systolic CHF, afib on Xarelto, s/p PPM, asthma, chronic respiratory failure on home O2 2L/min presented with wheezing and cough.   --Managed for asthma exacerbation and acute on chronic resp. failure  --She is gradually improving - no wheezing on exam today.   --CXR shows no pulmonary infiltrates.     Recommendations:   --cont symbicort/albuterol/singulair  --on Prednisone 60 mg/d. Recommend taper off prednisone over 7-8 days.   --dvt proph- on xarelto  --discharge planning

## 2022-01-07 NOTE — DISCHARGE NOTE NURSING/CASE MANAGEMENT/SOCIAL WORK - NSDCPEXARELTOCOMP_GEN_ALL_CORE
Stenosis of arteriovenous dialysis fistula, initial encounter Rivaroxaban/Xarelto is used to treat and prevent blood clots.  If you are not able to swallow the tablets whole, you may crush the tablets and mix them with a small amount of applesauce and promptly take within four hours. Eat some food right after you swallow the mixture. Take 2.5mg or 10mg tablets of Rivaroxaban/Xarelto with or without food. Take 15mg or 20mg tablets of Rivaroxaban/Xarelto with food. Never skip a dose of Rivaroxaban/Xarelto. If you take Rivaroxaban/Xarelto once a day and you forget to take your dose, take a dose as soon as you remember on the same day. If you take Rivaroxaban/Xarelto 2.5 mg twice a day and you forget to take your dose, skip the missed dose and take your next dose at your usual time. DO NOT take an extra pill to ‘catch up’. If you take Rivaroxaban/Xarelto 15 mg twice a day and you forget to take your dose, take a dose as soon as you remember on the same day. You may take 2 doses at the same time to make up for missed dose ONLY if you take a total of 30mg per day. Notify your doctor that you missed a dose. Take Rivaroxaban/Xarelto at the same time each morning and evening. Rivaroxaban/Xarelto may be taken with other medication or food.

## 2022-01-07 NOTE — DISCHARGE NOTE PROVIDER - NSDCFUADDAPPT_GEN_ALL_CORE_FT
follow up with pcp regarding better diabetes control and elevated hemoglobin.   follow up with pulmonary regarding asthma

## 2022-01-07 NOTE — DISCHARGE NOTE NURSING/CASE MANAGEMENT/SOCIAL WORK - NSDCPEFALRISK_GEN_ALL_CORE
For information on Fall & Injury Prevention, visit: [email or web address] OR  [email or web address] OR  https://www.cdc.gov/steadi/patient.html

## 2022-01-07 NOTE — DISCHARGE NOTE PROVIDER - HOSPITAL COURSE
81F w/PMH CAD/stent/CABG, afib on a/c,  PPM, T2DM, asthma, HFrEF, w/ Home O2 at night 2.5LNC, presented with  c/o worsening wheezing, cough and sob.  Pt endorses she was treated for asthma exac 3 weeks ago w/ doxycycline, prednisone taper and was improving until prednisone was down to 10mg and she seemed to have worsening symptoms again.  Pt is visiting up here from [place] and her medical team is down there. She denies any fever/chills, cp, palpitations, LE edema, n/v/d, abd pain, dysuria.  Vacc x3. She was admitted with acute asthma exacerbation. Treated with iv steroids, bronchodilators with improvement. she was followed by pulmonary while here. She is now medically stable for dc home.   she will f/u with pulmonary as outpt.     Vital Signs Last 24 Hrs  T(C): 36.9 (07 Jan 2022 09:15), Max: 37.2 (07 Jan 2022 00:51)  T(F): 98.5 (07 Jan 2022 09:15), Max: 99 (07 Jan 2022 00:51)  HR: 70 (07 Jan 2022 09:15) (70 - 73)  BP: 122/54 (07 Jan 2022 09:15) (114/62 - 122/54)  RR: 16 (07 Jan 2022 09:15) (16 - 17)  SpO2: 95% (07 Jan 2022 09:15) (95% - 98%)    PHYSICAL EXAM:    GENERAL: Comfortable, no acute distress   HEAD:  Normocephalic, atraumatic  EYES: EOMI, PERRLA  HEENT: Moist mucous membranes  NECK: Supple, No JVD  NERVOUS SYSTEM:  Alert & Oriented X3, Motor Strength 5/5 B/L upper and lower extremities  CHEST/LUNG: bilateral wheeze improving.  HEART: Regular rate and rhythm  ABDOMEN: Soft, Nontender, Nondistended, Bowel sounds present  GENITOURINARY: Voiding, no palpable bladder  EXTREMITIES:   No clubbing, cyanosis, or edema  MUSCULOSKELETAL- No muscle tenderness, no joint tenderness  SKIN-no rash        LABS:                        17.6   12.32 )-----------( 176      ( 07 Jan 2022 10:06 )             53.9     01-07    130<L>  |  93<L>  |  43<H>  ----------------------------<  284<H>  4.1   |  35<H>  |  0.92    Ca    10.3<H>      07 Jan 2022 10:06       Xray Chest 1 View- PORTABLE-Urgent (12.29.21 @ 03:28) >  IMPRESSION:  No active pulmonary disease.  Thank you for the courtesy of this referral.    FINAL DIAGNOSIS:    #Acute exacerbation of asthma  #Chronic systolic CHF  #polycythemia-TO F/U WITH PCP OUTPT.  #Chronic afib  #Type II Dm  #Mild hyponatremia-corrected sodium 133 for glucose.     time taken for dc 43 min  d/w pt  pt from [place]

## 2022-01-07 NOTE — DISCHARGE NOTE PROVIDER - NSDCCPCAREPLAN_GEN_ALL_CORE_FT
PRINCIPAL DISCHARGE DIAGNOSIS  Diagnosis: Acute asthma exacerbation  Assessment and Plan of Treatment: take steroids as advised.   follow up with pulmonary in office.         SECONDARY DISCHARGE DIAGNOSES  Diagnosis: Chest pain in adult  Assessment and Plan of Treatment:

## 2022-01-07 NOTE — PROGRESS NOTE ADULT - PROBLEM SELECTOR PROBLEM 1
Acute asthma exacerbation

## 2022-01-07 NOTE — PROGRESS NOTE ADULT - SUBJECTIVE AND OBJECTIVE BOX
SUBJECTIVE:  Seen and examined today.  Feeling better since admission.   Shortness of breath improved.  Wheezing improved.   No fevers.  Eating well.  Ambulating well.     1/7: in chair, no distress, feeling better.     ROS otherwise negative.   PHYSICAL EXAMINATION:    GENERAL APPEARANCE:  No distress. Sitting upright in bed.   HEAD: Normocephalic atraumatic   HEENT:  Mucus membranes moist. Oropharynx normal.   NECK:  Supple.  No stridor.  HEART:  Normal S1 and S2. There are no murmurs  CHEST:  Some decrease in bilat inspiratory wheezes, rhonchi.   ABDOMEN:  Soft and nontender. Nondistended.   SKIN:  No rash or significant lesions are noted. No jaundice.  PSYCH: Normal affect.  NEURO: Alert and oriented. Responds to question appropriate. No focal deficits appreciated.       Vital Signs Last 24 Hrs  T(C): 36.6 (06 Jan 2022 08:25), Max: 36.6 (05 Jan 2022 20:13)  T(F): 97.8 (06 Jan 2022 08:25), Max: 97.9 (05 Jan 2022 20:13)  HR: 73 (06 Jan 2022 08:25) (70 - 76)  BP: 124/71 (06 Jan 2022 08:25) (119/63 - 124/71)  BP(mean): --  RR: 16 (06 Jan 2022 08:25) (16 - 17)  SpO2: 95% (06 Jan 2022 08:25) (95% - 96%)    MEDICATIONS  (STANDING):  ALBUTerol    90 MICROgram(s) HFA Inhaler 2 Puff(s) Inhalation four times a day  atorvastatin 80 milliGRAM(s) Oral at bedtime  budesonide 160 MICROgram(s)/formoterol 4.5 MICROgram(s) Inhaler 2 Puff(s) Inhalation two times a day  dextrose 40% Gel 15 Gram(s) Oral once  dextrose 5%. 1000 milliLiter(s) (50 mL/Hr) IV Continuous <Continuous>  dextrose 5%. 1000 milliLiter(s) (100 mL/Hr) IV Continuous <Continuous>  dextrose 50% Injectable 25 Gram(s) IV Push once  dextrose 50% Injectable 12.5 Gram(s) IV Push once  dextrose 50% Injectable 25 Gram(s) IV Push once  DULoxetine 60 milliGRAM(s) Oral daily  glucagon  Injectable 1 milliGRAM(s) IntraMuscular once  glucagon  Injectable 1 milliGRAM(s) IntraMuscular once  insulin glargine Injectable (LANTUS) 45 Unit(s) SubCutaneous at bedtime  insulin lispro (ADMELOG) corrective regimen sliding scale   SubCutaneous three times a day before meals  insulin lispro (ADMELOG) corrective regimen sliding scale   SubCutaneous at bedtime  insulin lispro Injectable (ADMELOG) 10 Unit(s) SubCutaneous three times a day before meals  methylPREDNISolone sodium succinate Injectable 40 milliGRAM(s) IV Push two times a day  metoprolol succinate ER 50 milliGRAM(s) Oral two times a day  montelukast 10 milliGRAM(s) Oral daily  rivaroxaban 20 milliGRAM(s) Oral with dinner  spironolactone 25 milliGRAM(s) Oral daily  torsemide 40 milliGRAM(s) Oral daily    MEDICATIONS  (PRN):  guaiFENesin ER 1200 milliGRAM(s) Oral every 12 hours PRN Cough  ondansetron Injectable 4 milliGRAM(s) IV Push every 6 hours PRN Nausea      Allergies    penicillin (Swelling)    Intolerances          LABS:                        16.9   8.93  )-----------( 150      ( 05 Jan 2022 08:06 )             51.7     01-05    135  |  95<L>  |  43<H>  ----------------------------<  285<H>  4.6   |  37<H>  |  1.05    Ca    10.0      05 Jan 2022 08:06          RADIOLOGY & ADDITIONAL STUDIES:   *Imaging personally reviewed.

## 2022-01-07 NOTE — DISCHARGE NOTE PROVIDER - NSDCMRMEDTOKEN_GEN_ALL_CORE_FT
albuterol 90 mcg/inh inhalation aerosol: 2 puff(s) inhaled every 6 hours, As Needed  atorvastatin 80 mg oral tablet: 1 tab(s) orally once a day  Breo Ellipta 200 mcg-25 mcg/inh inhalation powder: 1 puff(s) inhaled once a day  DULoxetine 60 mg oral delayed release capsule: 1 cap(s) orally once a day  Jardiance 25 mg oral tablet: 1 tab(s) orally once a day (in the morning)  metoprolol succinate 50 mg oral tablet, extended release: 1 tab(s) orally 2 times a day  montelukast 10 mg oral tablet: 1 tab(s) orally once a day  predniSONE 20 mg oral tablet: 3 tab(s) orally once a day X 2 days, then 2 tabs oral daily X 2 days, then 1 tab oral daily X 2 days, then stop.   spironolactone 25 mg oral tablet: 1 tab(s) orally once a day  torsemide 20 mg oral tablet: 2 tab(s) orally once a day  Tresiba 100 units/mL subcutaneous solution: 30 unit(s) subcutaneous once a day (at bedtime)  Trulicity Pen 1.5 mg/0.5 mL subcutaneous solution: subcutaneous every 7 days  Xarelto 20 mg oral tablet: 1 tab(s) orally once a day (in the evening)

## 2022-01-07 NOTE — PROGRESS NOTE ADULT - PROBLEM SELECTOR PROBLEM 3
SOB (shortness of breath)

## 2022-01-07 NOTE — PROGRESS NOTE ADULT - PROBLEM SELECTOR PROBLEM 2
Acute bronchospasm

## 2022-01-07 NOTE — DISCHARGE NOTE NURSING/CASE MANAGEMENT/SOCIAL WORK - PATIENT PORTAL LINK FT
You can access the FollowMyHealth Patient Portal offered by [facility] by registering at the following website: [email or web address]. By joining [employer]’s FollowMyHealth portal, you will also be able to view your health information using other applications (apps) compatible with our system.

## 2022-01-12 PROBLEM — J45.909 UNSPECIFIED ASTHMA, UNCOMPLICATED: Chronic | Status: ACTIVE | Noted: 2022-01-08

## 2022-01-12 PROBLEM — I50.9 HEART FAILURE, UNSPECIFIED: Chronic | Status: ACTIVE | Noted: 2022-01-08

## 2022-01-12 PROBLEM — I25.10 ATHEROSCLEROTIC HEART DISEASE OF NATIVE CORONARY ARTERY WITHOUT ANGINA PECTORIS: Chronic | Status: ACTIVE | Noted: 2022-01-08

## 2022-01-13 DIAGNOSIS — T38.0X5A ADVERSE EFFECT OF GLUCOCORTICOIDS AND SYNTHETIC ANALOGUES, INITIAL ENCOUNTER: ICD-10-CM

## 2022-01-13 DIAGNOSIS — E87.1 HYPO-OSMOLALITY AND HYPONATREMIA: ICD-10-CM

## 2022-01-13 DIAGNOSIS — I48.20 CHRONIC ATRIAL FIBRILLATION, UNSPECIFIED: ICD-10-CM

## 2022-01-13 DIAGNOSIS — Z90.710 ACQUIRED ABSENCE OF BOTH CERVIX AND UTERUS: ICD-10-CM

## 2022-01-13 DIAGNOSIS — J43.9 EMPHYSEMA, UNSPECIFIED: ICD-10-CM

## 2022-01-13 DIAGNOSIS — Z99.81 DEPENDENCE ON SUPPLEMENTAL OXYGEN: ICD-10-CM

## 2022-01-13 DIAGNOSIS — Z95.1 PRESENCE OF AORTOCORONARY BYPASS GRAFT: ICD-10-CM

## 2022-01-13 DIAGNOSIS — I50.22 CHRONIC SYSTOLIC (CONGESTIVE) HEART FAILURE: ICD-10-CM

## 2022-01-13 DIAGNOSIS — Z95.0 PRESENCE OF CARDIAC PACEMAKER: ICD-10-CM

## 2022-01-13 DIAGNOSIS — I27.20 PULMONARY HYPERTENSION, UNSPECIFIED: ICD-10-CM

## 2022-01-13 DIAGNOSIS — E11.65 TYPE 2 DIABETES MELLITUS WITH HYPERGLYCEMIA: ICD-10-CM

## 2022-01-13 DIAGNOSIS — J96.20 ACUTE AND CHRONIC RESPIRATORY FAILURE, UNSPECIFIED WHETHER WITH HYPOXIA OR HYPERCAPNIA: ICD-10-CM

## 2022-01-13 DIAGNOSIS — I25.10 ATHEROSCLEROTIC HEART DISEASE OF NATIVE CORONARY ARTERY WITHOUT ANGINA PECTORIS: ICD-10-CM

## 2022-01-13 DIAGNOSIS — Z79.84 LONG TERM (CURRENT) USE OF ORAL HYPOGLYCEMIC DRUGS: ICD-10-CM

## 2022-01-13 DIAGNOSIS — Z88.0 ALLERGY STATUS TO PENICILLIN: ICD-10-CM

## 2022-01-13 DIAGNOSIS — J45.901 UNSPECIFIED ASTHMA WITH (ACUTE) EXACERBATION: ICD-10-CM

## 2022-01-13 DIAGNOSIS — F32.9 MAJOR DEPRESSIVE DISORDER, SINGLE EPISODE, UNSPECIFIED: ICD-10-CM

## 2022-01-13 DIAGNOSIS — D75.1 SECONDARY POLYCYTHEMIA: ICD-10-CM

## 2022-01-24 PROBLEM — Z00.00 ENCOUNTER FOR PREVENTIVE HEALTH EXAMINATION: Status: ACTIVE | Noted: 2022-01-24

## 2022-01-25 ENCOUNTER — NON-APPOINTMENT (OUTPATIENT)
Age: 82
End: 2022-01-25

## 2022-01-25 ENCOUNTER — APPOINTMENT (OUTPATIENT)
Dept: INTERNAL MEDICINE | Facility: CLINIC | Age: 82
End: 2022-01-25
Payer: MEDICARE

## 2022-01-25 VITALS
BODY MASS INDEX: 36.02 KG/M2 | RESPIRATION RATE: 16 BRPM | WEIGHT: 211 LBS | DIASTOLIC BLOOD PRESSURE: 60 MMHG | TEMPERATURE: 99.3 F | OXYGEN SATURATION: 80 % | SYSTOLIC BLOOD PRESSURE: 103 MMHG | HEART RATE: 92 BPM | HEIGHT: 64 IN

## 2022-01-25 DIAGNOSIS — Z86.79 PERSONAL HISTORY OF OTHER DISEASES OF THE CIRCULATORY SYSTEM: ICD-10-CM

## 2022-01-25 DIAGNOSIS — I25.10 ATHEROSCLEROTIC HEART DISEASE OF NATIVE CORONARY ARTERY W/OUT ANGINA PECTORIS: ICD-10-CM

## 2022-01-25 DIAGNOSIS — E78.00 PURE HYPERCHOLESTEROLEMIA, UNSPECIFIED: ICD-10-CM

## 2022-01-25 DIAGNOSIS — Z95.1 PRESENCE OF AORTOCORONARY BYPASS GRAFT: ICD-10-CM

## 2022-01-25 DIAGNOSIS — I48.0 PAROXYSMAL ATRIAL FIBRILLATION: ICD-10-CM

## 2022-01-25 DIAGNOSIS — E66.9 OBESITY, UNSPECIFIED: ICD-10-CM

## 2022-01-25 DIAGNOSIS — Z95.0 PRESENCE OF CARDIAC PACEMAKER: ICD-10-CM

## 2022-01-25 DIAGNOSIS — E11.9 TYPE 2 DIABETES MELLITUS W/OUT COMPLICATIONS: ICD-10-CM

## 2022-01-25 DIAGNOSIS — J45.909 UNSPECIFIED ASTHMA, UNCOMPLICATED: ICD-10-CM

## 2022-01-25 DIAGNOSIS — J98.4 OTHER DISORDERS OF LUNG: ICD-10-CM

## 2022-01-25 PROCEDURE — 99214 OFFICE O/P EST MOD 30 MIN: CPT | Mod: 25

## 2022-01-25 PROCEDURE — 94010 BREATHING CAPACITY TEST: CPT

## 2022-01-25 PROCEDURE — G0447 BEHAVIOR COUNSEL OBESITY 15M: CPT | Mod: 25

## 2022-01-25 RX ORDER — DOXYCYCLINE HYCLATE 100 MG/1
100 CAPSULE ORAL
Qty: 20 | Refills: 0 | Status: ACTIVE | COMMUNITY
Start: 2021-12-23

## 2022-01-25 RX ORDER — PREDNISONE 20 MG/1
20 TABLET ORAL
Qty: 12 | Refills: 0 | Status: ACTIVE | COMMUNITY
Start: 2022-01-07

## 2022-01-25 RX ORDER — PREDNISONE 10 MG/1
10 TABLET ORAL
Qty: 30 | Refills: 0 | Status: ACTIVE | COMMUNITY
Start: 2021-12-23

## 2022-01-25 RX ORDER — DULAGLUTIDE 4.5 MG/.5ML
INJECTION, SOLUTION SUBCUTANEOUS
Refills: 0 | Status: ACTIVE | COMMUNITY

## 2022-01-25 RX ORDER — BENZONATATE 200 MG/1
200 CAPSULE ORAL
Qty: 30 | Refills: 0 | Status: ACTIVE | COMMUNITY
Start: 2021-12-07

## 2022-01-25 RX ORDER — LACTOBACIL 2/BIFIDO 1/S.THERMO 900B CELL
PACKET (EA) ORAL
Qty: 60 | Refills: 0 | Status: ACTIVE | COMMUNITY
Start: 2021-08-26

## 2022-01-25 RX ORDER — DULOXETINE HYDROCHLORIDE 30 MG/1
30 CAPSULE, DELAYED RELEASE ORAL DAILY
Refills: 0 | Status: ACTIVE | COMMUNITY

## 2022-01-25 RX ORDER — DULOXETINE HYDROCHLORIDE 60 MG/1
60 CAPSULE, DELAYED RELEASE PELLETS ORAL
Qty: 90 | Refills: 0 | Status: ACTIVE | COMMUNITY
Start: 2021-09-30

## 2022-01-25 RX ORDER — FLUTICASONE FUROATE AND VILANTEROL TRIFENATATE 200; 25 UG/1; UG/1
200-25 POWDER RESPIRATORY (INHALATION)
Qty: 60 | Refills: 0 | Status: ACTIVE | COMMUNITY
Start: 2021-01-04

## 2022-01-25 RX ORDER — INSULIN DEGLUDEC INJECTION 100 U/ML
100 INJECTION, SOLUTION SUBCUTANEOUS
Refills: 0 | Status: ACTIVE | COMMUNITY

## 2022-01-25 RX ORDER — NITROGLYCERIN 0.4 MG/1
0.4 TABLET SUBLINGUAL
Refills: 0 | Status: ACTIVE | COMMUNITY

## 2022-01-25 RX ORDER — FLUCONAZOLE 150 MG/1
150 TABLET ORAL
Qty: 2 | Refills: 0 | Status: ACTIVE | COMMUNITY
Start: 2021-09-10

## 2022-01-25 RX ORDER — CLINDAMYCIN HYDROCHLORIDE 300 MG/1
300 CAPSULE ORAL
Qty: 10 | Refills: 0 | Status: ACTIVE | COMMUNITY
Start: 2021-08-26

## 2022-01-25 RX ORDER — ALBUTEROL SULFATE 90 UG/1
108 (90 BASE) INHALANT RESPIRATORY (INHALATION)
Refills: 0 | Status: ACTIVE | COMMUNITY

## 2022-01-25 RX ORDER — SPIRONOLACTONE 25 MG/1
25 TABLET ORAL
Refills: 0 | Status: ACTIVE | COMMUNITY

## 2022-01-25 RX ORDER — MONTELUKAST SODIUM 10 MG/1
10 TABLET, FILM COATED ORAL
Refills: 0 | Status: ACTIVE | COMMUNITY

## 2022-01-25 RX ORDER — TORSEMIDE 20 MG/1
20 TABLET ORAL 3 TIMES DAILY
Refills: 0 | Status: ACTIVE | COMMUNITY

## 2022-01-25 RX ORDER — RIVAROXABAN 20 MG/1
20 TABLET, FILM COATED ORAL
Refills: 0 | Status: ACTIVE | COMMUNITY

## 2022-01-25 RX ORDER — FLUTICASONE FUROATE AND VILANTEROL TRIFENATATE 100; 25 UG/1; UG/1
100-25 POWDER RESPIRATORY (INHALATION)
Refills: 0 | Status: ACTIVE | COMMUNITY

## 2022-01-25 RX ORDER — CHROMIUM 200 MCG
TABLET ORAL
Refills: 0 | Status: ACTIVE | COMMUNITY

## 2022-01-25 RX ORDER — EMPAGLIFLOZIN 25 MG/1
25 TABLET, FILM COATED ORAL
Refills: 0 | Status: ACTIVE | COMMUNITY

## 2022-01-25 RX ORDER — REPAGLINIDE 1 MG/1
1 TABLET ORAL TWICE DAILY
Refills: 0 | Status: ACTIVE | COMMUNITY

## 2022-01-25 RX ORDER — METOPROLOL SUCCINATE 50 MG/1
50 TABLET, EXTENDED RELEASE ORAL TWICE DAILY
Refills: 0 | Status: ACTIVE | COMMUNITY

## 2022-01-25 RX ORDER — ATORVASTATIN CALCIUM 80 MG/1
80 TABLET, FILM COATED ORAL DAILY
Refills: 0 | Status: ACTIVE | COMMUNITY

## 2022-01-25 RX ORDER — CYANOCOBALAMIN 1000 UG/ML
1000 INJECTION INTRAMUSCULAR; SUBCUTANEOUS
Qty: 3 | Refills: 0 | Status: ACTIVE | COMMUNITY
Start: 2021-11-16

## 2022-01-25 NOTE — HISTORY OF PRESENT ILLNESS
[TextBox_4] : This is a 81-year-old female with a history of obesity, diabetes, CAD, status post PCI I, status post CABG, history of systolic CHF, atrial fibrillation on Xarelto, and asthma, was recently admitted to [facility] December 29 to January 7.  She was treated with IV steroids and bronchodilators for asthma exacerbation.  She was discharged on Breo 1 inhalation/day, montelukast 10 mg/day, and albuterol inhaler as needed.  She will also continue her previous home O2 at 2.5 L/min during sleep and during the daytime.  She states that she is now feeling well and not having coughing, wheezing, or sputum production.  She is using her albuterol inhaler about 2 times per day.  Is not having recent chest pain, palpitations, or lightheadedness.  No fever or chills.

## 2022-01-25 NOTE — COUNSELING
[Potential consequences of obesity discussed] : Potential consequences of obesity discussed [Benefits of weight loss discussed] : Benefits of weight loss discussed [Decrease Portions] : decrease portions [FreeTextEntry2] : healthy foods, ADA [FreeTextEntry4] : 15

## 2022-01-25 NOTE — PROCEDURE
[FreeTextEntry1] : Spirometry today shows a moerate restrictive ventilatory deficit with an FVC of 1.23 or 47% predicted.\par \par Recheck O2 sat on room air at rest was 94%.

## 2022-01-25 NOTE — PHYSICAL EXAM
[No Acute Distress] : no acute distress [Normal Oropharynx] : normal oropharynx [Normal Appearance] : normal appearance [No Neck Mass] : no neck mass [Normal S1, S2] : normal s1, s2 [No Resp Distress] : no resp distress [Clear to Auscultation Bilaterally] : clear to auscultation bilaterally [No Abnormalities] : no abnormalities [Benign] : benign [Normal Gait] : normal gait [No Clubbing] : no clubbing [No Cyanosis] : no cyanosis [No Edema] : no edema [Normal Color/ Pigmentation] : normal color/ pigmentation [No Focal Deficits] : no focal deficits [Oriented x3] : oriented x3 [Normal Affect] : normal affect [TextBox_2] : obese [TextBox_54] : HR 72

## 2022-01-25 NOTE — ASSESSMENT
[FreeTextEntry1] : #1  81-year-old female with multiple medical conditions as noted above, status post hospitalization for asthma exacerbation.  She is much improved.  She feels well.  She will continue her current medical regimen, including Breo 1 inhalation per day, montelukast 10 mg p.o. daily, and albuterol 2 puffs 4 times daily on an as-needed basis.  She will continue nasal cannula O2 at 2.5 L/min 24/7.  She will monitor her O2 sats with walking to ensure that they are above 90%.  She will increase her nasal cannula O2 to 3 or 3.5 L/min with walking if needed to do so.  She plans to return to [place] on February 6.  She was instructed to do so only if her breathing remains in optimal condition and she is feeling well.  She will continue to wear her nasal cannula O2 during any airflight.  She will call or return at any time if she is having any symptoms.

## 2024-07-03 NOTE — PROGRESS NOTE ADULT - ASSESSMENT
80 yo woman with DM, CAD s/p PCI, CABG 2018, HfrEF, afib on AC, s/p PPM, asthma, chronic respiratory failure on home O2, last asthma exacerbation ~ 3 weeks prior to this presentation, was treated with course of doxycycline and prednisone, had prednisone tapered and once she was down to 10mg prednisone, she re-developed dyspnea, wheeze and cough so she came for evaluation. CXR negative. COVID, flu and RSV PCR negative. Started on IV steroids, continued on IC/LABA, admitted to Medicine.     Acute exacerbation of asthma  Suppose patient may have a component of more chronic obstructive lung disease. Has elevated HCO3, probably chronically hypercapneic in addition to chronically hypoxic for which she uses home O2 and some Hgb elevation. CXR neg. COVID, flu and RSV PCR negative. Appreciate input from Pulmonary Medicine. On IV steroids, IC, LABA, NABILA, and now improving gradually.   - Continue Solumedrol  - Continue IC/LABA/NABILA  - Will consider addition of LAMA but will discuss with Pulmonary first, would probably benefit from pulmonary function testing as outpatient, sleep evaluation as outpatient  - Carlos Alberto to complete 5 days  - Check peak flow    Chronic systolic CHF  Last LVEF 50%. Appears euvolemic  - Continue torsemide and spironolactone  - Is and Os  - Daily wt     Afib  Chronic, stable  - Continue beta blocker  - Continue Xarelto    Type II DM  A1c 9.0. Hyperglycemic in setting of steroids and underlying suboptimal control as evidenced by her elevated Hgb A1c. On Tresiba, Trulicity and Jardiance as outpatient. Here she has been placed on Lantus and Lispro.  - Check glucose TID AC and HS  - Continue Lantus and Lispro, anticipate needing to uptitrate prandial insulin coverage due to her steroid use       Diet: DM  DVT px: On Xarelto for afib  Code status: Full  Dispo: Anticipate DC to home when clinically improved   Spoke to patient's caregiver (Umairshayla MAGO)at nursing home she is residing and it seems patient urinary incontinence has not improved. Patient was previously on oxybutynin, then switched to trospium but both weren't working and now on vesicare 5mg daily, but It also seems to not be working. Per standing orders Vesicare will be increased to 10mg daily. MAGO Gagnon verbalized understanding and has no other questions at this time.    81 year-old woman with DM, CAD s/p PCI, CABG 2018, chronic systolic CHF, afib on Xarelto, s/p PPM, asthma, chronic respiratory failure on home O2 2L/min, last asthma exacerbation ~ 3 weeks prior to this presentation, was treated with course of doxycycline and prednisone, had prednisone tapered and once she was down to 10mg prednisone, she re-developed dyspnea, wheeze and cough so she came for evaluation. CXR negative. COVID, flu and RSV PCR negative. Started on IV steroids, continued on IC/LABA, admitted to Medicine.     Acute exacerbation of asthma  As suggested by symptoms triggered by tapering of her prednisone, as per patient. Denies tobacco use hx. Denies known hx of asthma. States her last PFTs were approx 1 year ago in [place]. She has chronic respiratory failure for which she uses home O2 but she attributes that moreso to her cardiac issues (CHF) than her asthma. CXR neg. COVID, flu and RSV PCR negative. Appreciate input from Pulmonary Medicine. On IV steroids, IC, LABA, NABILA, and now improving gradually though peak flow still < 300.   - Continue Solumedrol  - Continue IC/LABA/NABILA  - Can DC antibiotics as per Pulmonary Medicine  - Continue to check peak flow and trend for improvement    Chronic systolic CHF  Per patient, last echo 3 months ago in [place], approx LVEF 45-50%. Currently appears euvolemic. On PO torsemide and spironolactone. Toprol XL. Not on ACE or ARB.   - Will give dose of Lasix IV today to see if she has any positive response with respect to her breathing / wheeze etc.   - Resume PO torsemide and spironolactone tomorrow unless there is new or additional clinical evidence to suggest CHF exacerbation  - Continue Toprol XL  - Unclear why patient is not on ACE or ARB or Entresto, will discuss with patient, likely defer to her outside cardiologist  - Continue Is and Os, daily wt (requested RN re-weigh patient today and compare to 12/29)    Afib  Chronic, stable, rate WNL  - Continue Toprol  - Continue Xarelto    Type II DM  A1c 9.0. Hyperglycemic in setting of steroids and underlying suboptimal control as evidenced by her elevated Hgb A1c. On Tresiba, Trulicity and Jardiance as outpatient. Here she has been placed on Lantus and Lispro. Remains hyperglycemic.   - Continue Lantus and Lispro, but increase prandial Lispro today to address her ongoing hyperglycemia  - Continue to check glucose TID AC and HS      Diet: DM  DVT px: On Xarelto for afib  Code status: Full  Dispo: Anticipate DC to home when clinically improved, possibly 48 hrs   81 year-old woman with DM, CAD s/p PCI, CABG 2018, chronic systolic CHF, afib on Xarelto, s/p PPM, asthma, chronic respiratory failure on home O2 2L/min, last asthma exacerbation ~ 3 weeks prior to this presentation, was treated with course of doxycycline and prednisone, had prednisone tapered and once she was down to 10mg prednisone, she re-developed dyspnea, wheeze and cough so she came for evaluation. CXR negative. COVID, flu and RSV PCR negative. Started on IV steroids, continued on IC/LABA, admitted to Medicine.     Acute exacerbation of asthma  As suggested by symptoms triggered by tapering of her prednisone, as per patient. Denies tobacco use hx. Denies known hx of asthma. States her last PFTs were approx 1 year ago in [place]. She has chronic respiratory failure for which she uses home O2 but she attributes that moreso to her cardiac issues (CHF) than her asthma. CXR neg. COVID, flu and RSV PCR negative. Appreciate input from Pulmonary Medicine. On IV steroids, IC, LABA, NABILA, and now improving gradually though peak flow still < 300.   - Continue Solumedrol  - Continue IC/LABA/NABILA  - Can DC antibiotics as per Pulmonary Medicine  - Continue to check peak flow and trend for improvement    Chronic systolic CHF  Per patient, last echo 3 months ago in [place], approx LVEF 45-50%. Currently appears euvolemic. On PO torsemide and spironolactone. Toprol XL. Not on ACE or ARB.   - Will give dose of Lasix IV today to see if she has any positive response with respect to her breathing / wheeze etc.   - Resume PO torsemide and spironolactone tomorrow unless there is new or additional clinical evidence to suggest CHF exacerbation  - Continue Toprol XL  - Unclear why patient is not on ACE or ARB or Entresto, will discuss with patient, likely defer to her outside cardiologist  - Continue Is and Os, daily wt (requested RN re-weigh patient today and compare to 12/29)    Afib  Chronic, stable, rate WNL  - Continue Toprol  - Continue Xarelto    Type II DM  A1c 9.0. Hyperglycemic in setting of steroids and underlying suboptimal control as evidenced by her elevated Hgb A1c. On Tresiba, Trulicity and Jardiance as outpatient. Here she has been placed on Lantus and Lispro. Remains hyperglycemic.   - Continue Lantus and Lispro, but increase prandial Lispro today to address her ongoing hyperglycemia  - Continue to check glucose TID AC and HS      Diet: DM  DVT px: On Xarelto for afib  Code status: Full  Dispo: Anticipate DC to home when clinically improved, possibly 48-72 hrs   81 year-old woman with DM, CAD s/p PCI, CABG 2018, chronic systolic CHF, afib on Xarelto, s/p PPM, asthma, chronic respiratory failure on home O2 2L/min, last asthma exacerbation ~ 3 weeks prior to this presentation, was treated with course of doxycycline and prednisone, had prednisone tapered and once she was down to 10mg prednisone, she re-developed dyspnea, wheeze and cough so she came for evaluation. CXR negative. COVID, flu and RSV PCR negative. Started on IV steroids, continued on IC/LABA, admitted to Medicine.     Acute exacerbation of asthma  As suggested by symptoms triggered by tapering of her prednisone, as per patient. Denies tobacco use hx. Denies known hx of asthma. States her last PFTs were approx 1 year ago in [place]. She has chronic respiratory failure for which she uses home O2 but she attributes that moreso to her cardiac issues (CHF) than her asthma. CXR neg. COVID, flu and RSV PCR negative. Appreciate input from Pulmonary Medicine. On IV steroids, IC, LABA, NABILA, and now improving gradually though peak flow still < 300.   - Continue Solumedrol  - Continue IC/LABA/NABILA  - Can DC antibiotics as per Pulmonary Medicine  - Continue to check peak flow and trend for improvement    Chronic systolic CHF  Per patient, last echo 3 months ago in [place], approx LVEF 45-50%. Currently appears euvolemic. On PO torsemide and spironolactone. Toprol XL. Not on ACE or ARB.   - Will give dose of Lasix IV today to see if she has any positive response with respect to her breathing / wheeze etc.   - Resume PO torsemide and spironolactone tomorrow unless there is new or additional clinical evidence to suggest CHF exacerbation  - Continue Toprol XL  - Unclear why patient is not on ACE or ARB or Entresto, will discuss with patient, likely defer to her outside cardiologist  - Continue Is and Os, daily wt (requested RN re-weigh patient today and compare to 12/29)    Afib  Chronic, stable, rate WNL  - Continue Toprol  - Continue Xarelto    Type II DM  A1c 9.0. Hyperglycemic in setting of steroids and underlying suboptimal control as evidenced by her elevated Hgb A1c. On Tresiba, Trulicity and Jardiance as outpatient. Here she has been placed on Lantus and Lispro. Remains hyperglycemic.   - Continue Lantus and Lispro, but increase prandial Lispro today to address her ongoing hyperglycemia  - Continue to check glucose TID AC and HS    Mild hyponatremia  In setting of underlying CHF w/ reduced EF. Continue to manage as described above.  - Trend Na       Diet: DM  DVT px: On Xarelto for afib  Code status: Full  Dispo: Anticipate DC to home when clinically improved, possibly 48-72 hrs   81 year-old woman with DM, CAD s/p PCI, CABG 2018, chronic systolic CHF, afib on Xarelto, s/p PPM, asthma, chronic respiratory failure on home O2 2L/min, last asthma exacerbation ~ 3 weeks prior to this presentation, was treated with course of doxycycline and prednisone, had prednisone tapered and once she was down to 10mg prednisone, she re-developed dyspnea, wheeze and cough so she came for evaluation. CXR negative. COVID, flu and RSV PCR negative. Started on IV steroids, continued on IC/LABA, standing NABILA, admitted to Medicine.     Acute exacerbation of asthma  As suggested by symptoms triggered by tapering of her prednisone, as per patient. Denies tobacco use hx. Denies known hx of asthma. States her last PFTs were approx 1 year ago in [place]. She has chronic respiratory failure for which she uses home O2 but she attributes that moreso to her cardiac issues (CHF) than her asthma. CXR neg. COVID, flu and RSV PCR negative. Appreciate input from Pulmonary Medicine. On IV steroids, IC, LABA, NABILA, and now improving gradually though peak flow still < 300.   - Continue Solumedrol  - Continue IC/LABA/NABILA  - Can DC antibiotics as per Pulmonary Medicine  - Continue to check peak flow and trend for improvement    Chronic systolic CHF  Per patient, last echo 3 months ago in [place], approx LVEF 45-50%. Currently appears euvolemic. On PO torsemide and spironolactone. Toprol XL. Not on ACE or ARB.   - Will give dose of Lasix IV today to see if she has any positive response with respect to her breathing / wheeze etc.   - Resume PO torsemide and spironolactone tomorrow unless there is new or additional clinical evidence to suggest CHF exacerbation  - Continue Toprol XL  - Unclear why patient is not on ACE or ARB or Entresto, will discuss with patient, likely defer to her outside cardiologist  - Continue Is and Os, daily wt (requested RN re-weigh patient today and compare to 12/29)    Afib  Chronic, stable, rate WNL  - Continue Toprol  - Continue Xarelto    Type II DM  A1c 9.0. Hyperglycemic in setting of steroids and underlying suboptimal control as evidenced by her elevated Hgb A1c. On Tresiba, Trulicity and Jardiance as outpatient. Here she has been placed on Lantus and Lispro. Remains hyperglycemic.   - Continue Lantus and Lispro, but increase prandial Lispro today to address her ongoing hyperglycemia  - Continue to check glucose TID AC and HS    Mild hyponatremia  In setting of underlying CHF w/ reduced EF. Continue to manage as described above.  - Trend Na       Diet: DM  DVT px: On Xarelto for afib  Code status: Full  Dispo: Anticipate DC to home when clinically improved, possibly 48-72 hrs   81 year-old woman with DM, CAD s/p PCI, CABG 2018, chronic systolic CHF, afib on Xarelto, s/p PPM, asthma, chronic respiratory failure on home O2 2L/min, last asthma exacerbation ~ 3 weeks prior to this presentation, was treated with course of doxycycline and prednisone, had prednisone tapered and once she was down to 10mg prednisone, she re-developed dyspnea, wheeze and cough so she came for evaluation. CXR negative. COVID, flu and RSV PCR negative. Started on IV steroids, continued on IC/LABA, standing NABILA, admitted to Medicine.     Acute exacerbation of asthma  As suggested by symptoms triggered by tapering of her prednisone, as per patient. Denies tobacco use hx. Denies known hx of emphysema/COPD. States her last PFTs were approx 1 year ago in [place]. She has chronic respiratory failure for which she uses home O2 but she attributes that moreso to her cardiac issues (CHF) than her asthma. CXR neg. COVID, flu and RSV PCR negative. Appreciate input from Pulmonary Medicine. On IV steroids, IC, LABA, NABILA, and now improving gradually though peak flow still < 300.   - Continue Solumedrol  - Continue IC/LABA/NABILA  - Can DC antibiotics as per Pulmonary Medicine  - Continue to check peak flow and trend for improvement    Chronic systolic CHF  Per patient, last echo 3 months ago in [place], approx LVEF 45-50%. Currently appears euvolemic. On PO torsemide and spironolactone. Toprol XL. Not on ACE or ARB.   - Will give dose of Lasix IV today to see if she has any positive response with respect to her breathing / wheeze etc.   - Resume PO torsemide and spironolactone tomorrow unless there is new or additional clinical evidence to suggest CHF exacerbation  - Continue Toprol XL  - Unclear why patient is not on ACE or ARB or Entresto, will discuss with patient, likely defer to her outside cardiologist  - Continue Is and Os, daily wt (requested RN re-weigh patient today and compare to 12/29)    Afib  Chronic, stable, rate WNL  - Continue Toprol  - Continue Xarelto    Type II DM  A1c 9.0. Hyperglycemic in setting of steroids and underlying suboptimal control as evidenced by her elevated Hgb A1c. On Tresiba, Trulicity and Jardiance as outpatient. Here she has been placed on Lantus and Lispro. Remains hyperglycemic.   - Continue Lantus and Lispro, but increase prandial Lispro today to address her ongoing hyperglycemia  - Continue to check glucose TID AC and HS    Mild hyponatremia  In setting of underlying CHF w/ reduced EF. Continue to manage as described above.  - Trend Na       Diet: DM  DVT px: On Xarelto for afib  Code status: Full  Dispo: Anticipate DC to home when clinically improved, possibly 48-72 hrs